# Patient Record
Sex: FEMALE | Race: OTHER | Employment: FULL TIME | URBAN - METROPOLITAN AREA
[De-identification: names, ages, dates, MRNs, and addresses within clinical notes are randomized per-mention and may not be internally consistent; named-entity substitution may affect disease eponyms.]

---

## 2017-11-13 ENCOUNTER — HOSPITAL ENCOUNTER (EMERGENCY)
Age: 27
Discharge: HOME | End: 2017-11-13
Attending: EMERGENCY MEDICINE

## 2017-11-13 VITALS
HEIGHT: 62 IN | SYSTOLIC BLOOD PRESSURE: 129 MMHG | TEMPERATURE: 98.3 F | WEIGHT: 120 LBS | BODY MASS INDEX: 22.08 KG/M2 | RESPIRATION RATE: 16 BRPM | DIASTOLIC BLOOD PRESSURE: 78 MMHG | OXYGEN SATURATION: 100 % | HEART RATE: 107 BPM

## 2017-11-13 DIAGNOSIS — B96.89 BACTERIAL VAGINITIS: ICD-10-CM

## 2017-11-13 DIAGNOSIS — N93.9 VAGINAL BLEEDING: Primary | ICD-10-CM

## 2017-11-13 DIAGNOSIS — Z20.2 STD EXPOSURE: ICD-10-CM

## 2017-11-13 DIAGNOSIS — N76.0 BACTERIAL VAGINITIS: ICD-10-CM

## 2017-11-13 LAB
-: ABNORMAL
AMORPHOUS: ABNORMAL
BACTERIA: ABNORMAL
BILIRUBIN URINE: NEGATIVE
CASTS UA: ABNORMAL /LPF
COLOR: YELLOW
COMMENT UA: ABNORMAL
CRYSTALS, UA: ABNORMAL /HPF
DIRECT EXAM: ABNORMAL
EPITHELIAL CELLS UA: ABNORMAL /HPF
GLUCOSE URINE: NEGATIVE
HCG(URINE) PREGNANCY TEST: NEGATIVE
KETONES, URINE: NEGATIVE
LEUKOCYTE ESTERASE, URINE: NEGATIVE
Lab: ABNORMAL
MUCUS: ABNORMAL
NITRITE, URINE: POSITIVE
OTHER OBSERVATIONS UA: ABNORMAL
PH UA: 7.5 (ref 5–8)
PROTEIN UA: NEGATIVE
RBC UA: ABNORMAL /HPF
RENAL EPITHELIAL, UA: ABNORMAL /HPF
SPECIFIC GRAVITY UA: 1.02 (ref 1–1.03)
SPECIMEN DESCRIPTION: ABNORMAL
SPECIMEN DESCRIPTION: ABNORMAL
STATUS: ABNORMAL
TRICHOMONAS: ABNORMAL
TURBIDITY: ABNORMAL
URINE HGB: ABNORMAL
UROBILINOGEN, URINE: NORMAL
WBC UA: ABNORMAL /HPF
YEAST: ABNORMAL

## 2017-11-13 PROCEDURE — 81001 URINALYSIS AUTO W/SCOPE: CPT

## 2017-11-13 PROCEDURE — 87510 GARDNER VAG DNA DIR PROBE: CPT

## 2017-11-13 PROCEDURE — 6370000000 HC RX 637 (ALT 250 FOR IP): Performed by: EMERGENCY MEDICINE

## 2017-11-13 PROCEDURE — 6360000002 HC RX W HCPCS: Performed by: EMERGENCY MEDICINE

## 2017-11-13 PROCEDURE — 87480 CANDIDA DNA DIR PROBE: CPT

## 2017-11-13 PROCEDURE — 87491 CHLMYD TRACH DNA AMP PROBE: CPT

## 2017-11-13 PROCEDURE — 87660 TRICHOMONAS VAGIN DIR PROBE: CPT

## 2017-11-13 PROCEDURE — 96372 THER/PROPH/DIAG INJ SC/IM: CPT

## 2017-11-13 PROCEDURE — 84703 CHORIONIC GONADOTROPIN ASSAY: CPT

## 2017-11-13 PROCEDURE — 99284 EMERGENCY DEPT VISIT MOD MDM: CPT

## 2017-11-13 PROCEDURE — 87591 N.GONORRHOEAE DNA AMP PROB: CPT

## 2017-11-13 RX ORDER — METRONIDAZOLE 500 MG/1
500 TABLET ORAL 2 TIMES DAILY
Qty: 14 TABLET | Refills: 0 | Status: SHIPPED | OUTPATIENT
Start: 2017-11-13 | End: 2017-11-20

## 2017-11-13 RX ORDER — AZITHROMYCIN 250 MG/1
1000 TABLET, FILM COATED ORAL ONCE
Status: COMPLETED | OUTPATIENT
Start: 2017-11-13 | End: 2017-11-13

## 2017-11-13 RX ORDER — IBUPROFEN 600 MG/1
600 TABLET ORAL ONCE
Status: COMPLETED | OUTPATIENT
Start: 2017-11-13 | End: 2017-11-13

## 2017-11-13 RX ORDER — CEFTRIAXONE 500 MG/1
250 INJECTION, POWDER, FOR SOLUTION INTRAMUSCULAR; INTRAVENOUS ONCE
Status: COMPLETED | OUTPATIENT
Start: 2017-11-13 | End: 2017-11-13

## 2017-11-13 RX ADMIN — IBUPROFEN 600 MG: 600 TABLET, FILM COATED ORAL at 11:37

## 2017-11-13 RX ADMIN — AZITHROMYCIN 1000 MG: 250 TABLET, FILM COATED ORAL at 11:36

## 2017-11-13 RX ADMIN — CEFTRIAXONE SODIUM 250 MG: 500 INJECTION, POWDER, FOR SOLUTION INTRAMUSCULAR; INTRAVENOUS at 11:37

## 2017-11-13 ASSESSMENT — PAIN SCALES - GENERAL: PAINLEVEL_OUTOF10: 0

## 2017-11-13 NOTE — ED PROVIDER NOTES
[address]  eMERGENCY dEPARTMENT eNCOUnter    Pt Name: Albino Douglas  MRN: [id number]  [place] 1990  Date of evaluation: 11/13/17  CHIEF COMPLAINT       Chief Complaint   Patient presents with    Threatened Miscarriage     HISTORY OF PRESENT ILLNESS   HPI  19-year-old female past medical history as listed below presents to ER with vaginal bleeding. Patient states her last menstrual period that she can remember was possibly 6 months ago. Patient denies pelvic pain or discharge. Patient's concerned about possible pregnancy. Patient states she was recently released And was informed that her partner tested positive for chlamydia. REVIEW OF SYSTEMS     Review of Systems   All other systems reviewed and are negative. PAST MEDICAL HISTORY     Past Medical History:   Diagnosis Date    Gall stones     Psychiatric problem     Seizure ([facility] [address] 9/17/2014    Substance abuse      SURGICAL HISTORY       Past Surgical History:   Procedure Laterality Date    CHOLECYSTECTOMY      GALLBLADDER SURGERY       CURRENT MEDICATIONS       Discharge Medication List as of 11/13/2017 12:16 PM      CONTINUE these medications which have NOT CHANGED    Details   QUEtiapine (SEROQUEL) 300 MG tablet Take 300 mg by mouth nightly. traZODone (DESYREL) 100 MG tablet Take 1 tablet by mouth nightly as needed for Sleep., Disp-15 tablet, R-0      QUEtiapine (SEROQUEL) 300 MG tablet Take 1 tablet by mouth nightly for 14 days. , Disp-14 tablet, R-0           ALLERGIES     is allergic to haldol [haloperidol lactate]. FAMILY HISTORY     has no family status information on file. SOCIAL HISTORY      reports that she has been smoking Cigarettes. She has a 1.50 pack-year smoking history. She has never used smokeless tobacco. She reports that she uses drugs, including Marijuana. She reports that she does not drink alcohol.   PHYSICAL EXAM     INITIAL VITALS: /78   Pulse 107   Temp 98.3 °F (36.8 °C) (Oral)   Resp 16 ultrasound images (except ED bedside ultrasound) are read by the radiologist and interpretations are viewed by the emergency physician. No orders to display     LABS: All lab results were reviewed by myself, and all abnormals are listed below. Labs Reviewed   VAGINITIS DNA PROBE - Abnormal; Notable for the following:        Result Value    Direct Exam POSITIVE for Gardnerella vaginalis. (*)     All other components within normal limits   URINALYSIS - Abnormal; Notable for the following:     Turbidity UA CLOUDY (*)     Urine Hgb MOD (*)     Nitrite, Urine POSITIVE (*)     All other components within normal limits   MICROSCOPIC URINALYSIS - Abnormal; Notable for the following:     Bacteria, UA MANY (*)     All other components within normal limits   C.TRACHOMATIS N.GONORRHOEAE DNA   PREGNANCY, URINE     EMERGENCY DEPARTMENT COURSE:     Vitals:    Vitals:    11/13/17 1026   BP: 129/78   Pulse: 107   Resp: 16   Temp: 98.3 °F (36.8 °C)   TempSrc: Oral   SpO2: 100%   Weight: 120 lb (54.4 kg)   Height: 5' 2\" (1.575 m)     The patient was given the following medications while in the emergency department:  Orders Placed This Encounter   Medications    azithromycin (ZITHROMAX) tablet 1,000 mg    cefTRIAXone (ROCEPHIN) injection 250 mg    ibuprofen (ADVIL;MOTRIN) tablet 600 mg    metroNIDAZOLE (FLAGYL) 500 MG tablet     Sig: Take 1 tablet by mouth 2 times daily for 7 days     Dispense:  14 tablet     Refill:  0     CONSULTS:  None    FINAL IMPRESSION      1. Vaginal bleeding    2. Bacterial vaginitis    3. STD exposure          DISPOSITION/PLAN   DISPOSITION Decision to Discharge    PATIENT REFERRED TO:  No follow-up provider specified.   DISCHARGE MEDICATIONS:  Discharge Medication List as of 11/13/2017 12:16 PM      START taking these medications    Details   metroNIDAZOLE (FLAGYL) 500 MG tablet Take 1 tablet by mouth 2 times daily for 7 days, Disp-14 tablet, R-0Print           Shea Singh MD  Attending

## 2017-11-13 NOTE — ED NOTES
Pt ambulated to the exit without assistance or difficulty; gait even and steady. Pt denied the need for a wheelchair. Pt educated on discharge instructions, pt verbalized understanding. Pt is eupneic, A&OX4, and PWD.        Dayana Marc RN  11/13/17 3766

## 2017-11-13 NOTE — ED NOTES
Pt at nurses station, requesting to be discharged; pt does not want to wait on results; Dr. Ricardo Valdez spoke with the pt and informed them what he was waiting on but pt insisted to be discharged.      Chhaya Peacock RN  11/13/17 3258

## 2017-11-14 LAB
C TRACH DNA GENITAL QL NAA+PROBE: NEGATIVE
N. GONORRHOEAE DNA: ABNORMAL

## 2018-01-01 ENCOUNTER — HOSPITAL ENCOUNTER (EMERGENCY)
Age: 28
Discharge: HOME | End: 2018-01-01
Attending: EMERGENCY MEDICINE

## 2018-01-01 VITALS
SYSTOLIC BLOOD PRESSURE: 133 MMHG | HEART RATE: 100 BPM | HEIGHT: 62 IN | OXYGEN SATURATION: 100 % | RESPIRATION RATE: 16 BRPM | DIASTOLIC BLOOD PRESSURE: 64 MMHG | BODY MASS INDEX: 22.08 KG/M2 | WEIGHT: 120 LBS | TEMPERATURE: 98.2 F

## 2018-01-01 DIAGNOSIS — F19.10 SUBSTANCE ABUSE (HCC): Primary | ICD-10-CM

## 2018-01-01 PROCEDURE — 99284 EMERGENCY DEPT VISIT MOD MDM: CPT

## 2018-01-01 ASSESSMENT — ENCOUNTER SYMPTOMS
COUGH: 0
BACK PAIN: 0
NAUSEA: 0
TROUBLE SWALLOWING: 0
SHORTNESS OF BREATH: 0
ABDOMINAL PAIN: 0
VOMITING: 0

## 2018-01-02 NOTE — ED PROVIDER NOTES
[address]  Emergency Department  Faculty Attestation     Pt Name: Remington Rojas  MRN: [id number]  [place] 1990  Date of evaluation: 1/2/18    I was personally available for consultation by the MLP in the Emergency Department for chart review, as well as discussion about the assessment, treatment plan and disposition. Remington Rojas is a 28 y.o. female who presents with Addiction Problem (pt presents with request to be admitted for opiate addiction; last use of heroin @ 14:30.)      Vitals:   Vitals:    01/01/18 1631   BP: 133/64   Pulse: 100   Resp: 16   Temp: 98.2 °F (36.8 °C)   TempSrc: Oral   SpO2: 100%   Weight: 120 lb (54.4 kg)   Height: 5' 2\" (1.575 m)       Impression:   1.  Substance abuse        Ines Membreno MD  Attending Emergency  Physician    (Please note that portions of this note were completed with a voice recognition program.  Efforts were made to edit the dictations but occasionally words are mis-transcribed.)        Yakov Nayak MD  01/02/18 9512

## 2018-05-15 ENCOUNTER — HOSPITAL ENCOUNTER (EMERGENCY)
Age: 28
Discharge: HOME | End: 2018-05-15
Attending: EMERGENCY MEDICINE
Payer: MEDICAID

## 2018-05-15 VITALS
TEMPERATURE: 97.1 F | SYSTOLIC BLOOD PRESSURE: 125 MMHG | RESPIRATION RATE: 15 BRPM | BODY MASS INDEX: 21.95 KG/M2 | OXYGEN SATURATION: 97 % | HEART RATE: 110 BPM | DIASTOLIC BLOOD PRESSURE: 75 MMHG | WEIGHT: 120 LBS

## 2018-05-15 DIAGNOSIS — L25.9 CONTACT DERMATITIS, UNSPECIFIED CONTACT DERMATITIS TYPE, UNSPECIFIED TRIGGER: Primary | ICD-10-CM

## 2018-05-15 PROCEDURE — 6370000000 HC RX 637 (ALT 250 FOR IP): Performed by: PHYSICIAN ASSISTANT

## 2018-05-15 PROCEDURE — 99282 EMERGENCY DEPT VISIT SF MDM: CPT

## 2018-05-15 RX ORDER — DIPHENHYDRAMINE HCL 25 MG
25 TABLET ORAL EVERY 6 HOURS PRN
Status: DISCONTINUED | OUTPATIENT
Start: 2018-05-15 | End: 2018-05-15 | Stop reason: HOSPADM

## 2018-05-15 RX ORDER — DIPHENHYDRAMINE HCL 25 MG
25 CAPSULE ORAL EVERY 6 HOURS PRN
Qty: 15 CAPSULE | Refills: 0 | Status: SHIPPED | OUTPATIENT
Start: 2018-05-15 | End: 2018-05-25

## 2018-05-15 RX ADMIN — DIPHENHYDRAMINE HCL 25 MG: 25 TABLET ORAL at 18:09

## 2018-05-15 ASSESSMENT — PAIN SCALES - GENERAL: PAINLEVEL_OUTOF10: 8

## 2018-05-15 ASSESSMENT — ENCOUNTER SYMPTOMS
COUGH: 0
WHEEZING: 0
SORE THROAT: 0
VOMITING: 0
NAUSEA: 0
BACK PAIN: 0
COLOR CHANGE: 0
RHINORRHEA: 0

## 2018-05-16 ENCOUNTER — HOSPITAL ENCOUNTER (OUTPATIENT)
Age: 28
Setting detail: SPECIMEN
Discharge: HOME | End: 2018-05-16
Payer: MEDICAID

## 2018-05-16 LAB
ABSOLUTE EOS #: 0.34 K/UL (ref 0–0.44)
ABSOLUTE IMMATURE GRANULOCYTE: <0.03 K/UL (ref 0–0.3)
ABSOLUTE LYMPH #: 2.51 K/UL (ref 1.1–3.7)
ABSOLUTE MONO #: 0.68 K/UL (ref 0.1–1.2)
ALBUMIN SERPL-MCNC: 3.8 G/DL (ref 3.5–5.2)
ALBUMIN/GLOBULIN RATIO: 1 (ref 1–2.5)
ALP BLD-CCNC: 73 U/L (ref 35–104)
ALT SERPL-CCNC: 81 U/L (ref 5–33)
ANION GAP SERPL CALCULATED.3IONS-SCNC: 13 MMOL/L (ref 9–17)
AST SERPL-CCNC: 47 U/L
BASOPHILS # BLD: 1 % (ref 0–2)
BASOPHILS ABSOLUTE: 0.03 K/UL (ref 0–0.2)
BILIRUB SERPL-MCNC: <0.1 MG/DL (ref 0.3–1.2)
BILIRUBIN DIRECT: <0.08 MG/DL
BILIRUBIN, INDIRECT: ABNORMAL MG/DL (ref 0–1)
BUN BLDV-MCNC: 8 MG/DL (ref 6–20)
CALCIUM SERPL-MCNC: 8.8 MG/DL (ref 8.6–10.4)
CHLORIDE BLD-SCNC: 95 MMOL/L (ref 98–107)
CHOLESTEROL/HDL RATIO: 3.6
CHOLESTEROL: 146 MG/DL
CO2: 25 MMOL/L (ref 20–31)
CREAT SERPL-MCNC: 0.62 MG/DL (ref 0.5–0.9)
DIFFERENTIAL TYPE: ABNORMAL
EOSINOPHILS RELATIVE PERCENT: 6 % (ref 1–4)
GFR AFRICAN AMERICAN: >60 ML/MIN
GFR NON-AFRICAN AMERICAN: >60 ML/MIN
GFR SERPL CREATININE-BSD FRML MDRD: ABNORMAL ML/MIN/{1.73_M2}
GFR SERPL CREATININE-BSD FRML MDRD: ABNORMAL ML/MIN/{1.73_M2}
GLUCOSE BLD-MCNC: 88 MG/DL (ref 70–99)
HAV IGM SER IA-ACNC: NONREACTIVE
HCG QUALITATIVE: NEGATIVE
HCT VFR BLD CALC: 37.9 % (ref 36.3–47.1)
HDLC SERPL-MCNC: 41 MG/DL
HEMOGLOBIN: 11.4 G/DL (ref 11.9–15.1)
HEPATITIS B CORE IGM ANTIBODY: NONREACTIVE
HEPATITIS B SURFACE ANTIGEN: NONREACTIVE
HEPATITIS C ANTIBODY: REACTIVE
HIV AG/AB: NONREACTIVE
IMMATURE GRANULOCYTES: 0 %
LDL CHOLESTEROL: 77 MG/DL (ref 0–130)
LYMPHOCYTES # BLD: 44 % (ref 24–43)
MCH RBC QN AUTO: 24.6 PG (ref 25.2–33.5)
MCHC RBC AUTO-ENTMCNC: 30.1 G/DL (ref 28.4–34.8)
MCV RBC AUTO: 81.7 FL (ref 82.6–102.9)
MONOCYTES # BLD: 12 % (ref 3–12)
NRBC AUTOMATED: 0 PER 100 WBC
PDW BLD-RTO: 17.8 % (ref 11.8–14.4)
PLATELET # BLD: 216 K/UL (ref 138–453)
PLATELET ESTIMATE: ABNORMAL
PMV BLD AUTO: 10.3 FL (ref 8.1–13.5)
POTASSIUM SERPL-SCNC: 4.5 MMOL/L (ref 3.7–5.3)
RBC # BLD: 4.64 M/UL (ref 3.95–5.11)
RBC # BLD: ABNORMAL 10*6/UL
SEG NEUTROPHILS: 37 % (ref 36–65)
SEGMENTED NEUTROPHILS ABSOLUTE COUNT: 2.09 K/UL (ref 1.5–8.1)
SODIUM BLD-SCNC: 133 MMOL/L (ref 135–144)
T3 FREE: 3.77 PG/ML (ref 2.02–4.43)
THYROXINE, FREE: 1.16 NG/DL (ref 0.93–1.7)
TOTAL PROTEIN: 7.5 G/DL (ref 6.4–8.3)
TRIGL SERPL-MCNC: 139 MG/DL
TSH SERPL DL<=0.05 MIU/L-ACNC: 1.03 MIU/L (ref 0.3–5)
VLDLC SERPL CALC-MCNC: NORMAL MG/DL (ref 1–30)
WBC # BLD: 5.7 K/UL (ref 3.5–11.3)
WBC # BLD: ABNORMAL 10*3/UL

## 2018-05-18 LAB
QUANTIFERON (R) TB GOLD (INCUBATED): NEGATIVE
QUANTIFERON MITOGEN: >10 IU/ML
QUANTIFERON NIL: 0.04 IU/ML
QUANTIFERON TB AG MINUS NIL: 0.04 IU/ML (ref 0–0.34)

## 2018-09-13 ENCOUNTER — HOSPITAL ENCOUNTER (INPATIENT)
Age: 28
LOS: 1 days | Discharge: LEFT BEFORE DISCHARGE | DRG: 770 | End: 2018-09-13
Attending: INTERNAL MEDICINE | Admitting: INTERNAL MEDICINE
Payer: MEDICAID

## 2018-09-13 VITALS
BODY MASS INDEX: 21.05 KG/M2 | TEMPERATURE: 98.2 F | WEIGHT: 115.08 LBS | DIASTOLIC BLOOD PRESSURE: 89 MMHG | SYSTOLIC BLOOD PRESSURE: 123 MMHG | RESPIRATION RATE: 23 BRPM | OXYGEN SATURATION: 100 % | HEART RATE: 146 BPM

## 2018-09-13 PROBLEM — F14.10 COCAINE ABUSE (HCC): Status: ACTIVE | Noted: 2018-09-13

## 2018-09-13 PROBLEM — F17.200 SMOKING: Status: RESOLVED | Noted: 2018-09-13 | Resolved: 2018-09-13

## 2018-09-13 PROBLEM — R76.8 HEPATITIS C ANTIBODY TEST POSITIVE: Status: ACTIVE | Noted: 2018-09-13

## 2018-09-13 PROBLEM — F11.90 HEROIN USE: Status: RESOLVED | Noted: 2018-09-13 | Resolved: 2018-09-13

## 2018-09-13 PROBLEM — F10.10 ALCOHOL ABUSE: Status: ACTIVE | Noted: 2018-09-13

## 2018-09-13 PROBLEM — R56.9 SEIZURES (HCC): Status: ACTIVE | Noted: 2018-09-13

## 2018-09-13 PROBLEM — F10.10 ALCOHOL ABUSE: Status: RESOLVED | Noted: 2018-09-13 | Resolved: 2018-09-13

## 2018-09-13 PROBLEM — F17.200 SMOKING: Status: ACTIVE | Noted: 2018-09-13

## 2018-09-13 PROBLEM — F19.10 POLYSUBSTANCE ABUSE (HCC): Status: ACTIVE | Noted: 2018-09-13

## 2018-09-13 PROBLEM — F11.90 HEROIN USE: Status: ACTIVE | Noted: 2018-09-13

## 2018-09-13 PROBLEM — Z72.0 TOBACCO USE: Status: ACTIVE | Noted: 2018-09-13

## 2018-09-13 PROBLEM — F14.10 COCAINE ABUSE (HCC): Status: RESOLVED | Noted: 2018-09-13 | Resolved: 2018-09-13

## 2018-09-13 PROBLEM — F19.90 IV DRUG USER: Status: ACTIVE | Noted: 2018-09-13

## 2018-09-13 PROBLEM — Z78.9 HISTORY OF INCARCERATION: Status: ACTIVE | Noted: 2018-09-13

## 2018-09-13 LAB
ABSOLUTE EOS #: 0.07 K/UL (ref 0–0.44)
ABSOLUTE IMMATURE GRANULOCYTE: <0.03 K/UL (ref 0–0.3)
ABSOLUTE LYMPH #: 2.43 K/UL (ref 1.1–3.7)
ABSOLUTE MONO #: 0.65 K/UL (ref 0.1–1.2)
ACETAMINOPHEN LEVEL: <5 UG/ML (ref 10–30)
ALBUMIN SERPL-MCNC: 3.4 G/DL (ref 3.5–5.2)
ALBUMIN/GLOBULIN RATIO: 0.9 (ref 1–2.5)
ALLEN TEST: POSITIVE
ALP BLD-CCNC: 169 U/L (ref 35–104)
ALT SERPL-CCNC: 68 U/L (ref 5–33)
ANION GAP SERPL CALCULATED.3IONS-SCNC: 12 MMOL/L (ref 9–17)
ANION GAP: 10 MMOL/L (ref 7–16)
AST SERPL-CCNC: 27 U/L
BASOPHILS # BLD: 0 % (ref 0–2)
BASOPHILS ABSOLUTE: <0.03 K/UL (ref 0–0.2)
BILIRUB SERPL-MCNC: 0.26 MG/DL (ref 0.3–1.2)
BUN BLDV-MCNC: 4 MG/DL (ref 6–20)
BUN/CREAT BLD: ABNORMAL (ref 9–20)
CALCIUM SERPL-MCNC: 8.8 MG/DL (ref 8.6–10.4)
CHLORIDE BLD-SCNC: 102 MMOL/L (ref 98–107)
CO2: 23 MMOL/L (ref 20–31)
CREAT SERPL-MCNC: 0.7 MG/DL (ref 0.5–0.9)
DIFFERENTIAL TYPE: ABNORMAL
EKG ATRIAL RATE: 84 BPM
EKG P AXIS: 55 DEGREES
EKG P-R INTERVAL: 122 MS
EKG Q-T INTERVAL: 348 MS
EKG QRS DURATION: 88 MS
EKG QTC CALCULATION (BAZETT): 411 MS
EKG R AXIS: 96 DEGREES
EKG T AXIS: 47 DEGREES
EKG VENTRICULAR RATE: 84 BPM
EOSINOPHILS RELATIVE PERCENT: 1 % (ref 1–4)
ETHANOL PERCENT: <0.01 %
ETHANOL: <10 MG/DL
FIO2: 21
GFR AFRICAN AMERICAN: >60 ML/MIN
GFR NON-AFRICAN AMERICAN: >60 ML/MIN
GFR NON-AFRICAN AMERICAN: >60 ML/MIN
GFR SERPL CREATININE-BSD FRML MDRD: >60 ML/MIN
GFR SERPL CREATININE-BSD FRML MDRD: ABNORMAL ML/MIN/{1.73_M2}
GFR SERPL CREATININE-BSD FRML MDRD: ABNORMAL ML/MIN/{1.73_M2}
GFR SERPL CREATININE-BSD FRML MDRD: NORMAL ML/MIN/{1.73_M2}
GLUCOSE BLD-MCNC: 81 MG/DL (ref 70–99)
GLUCOSE BLD-MCNC: 82 MG/DL (ref 65–105)
GLUCOSE BLD-MCNC: 90 MG/DL (ref 74–100)
HCT VFR BLD CALC: 36 % (ref 36.3–47.1)
HEMOGLOBIN: 10.9 G/DL (ref 11.9–15.1)
IMMATURE GRANULOCYTES: 0 %
INR BLD: 1
LACTIC ACID, WHOLE BLOOD: 1.1 MMOL/L (ref 0.7–2.1)
LYMPHOCYTES # BLD: 38 % (ref 24–43)
MCH RBC QN AUTO: 24.2 PG (ref 25.2–33.5)
MCHC RBC AUTO-ENTMCNC: 30.3 G/DL (ref 28.4–34.8)
MCV RBC AUTO: 80 FL (ref 82.6–102.9)
MODE: ABNORMAL
MONOCYTES # BLD: 10 % (ref 3–12)
MRSA, DNA, NASAL: NORMAL
MYOGLOBIN: <21 NG/ML (ref 25–58)
NEGATIVE BASE EXCESS, ART: ABNORMAL (ref 0–2)
NRBC AUTOMATED: 0 PER 100 WBC
O2 DEVICE/FLOW/%: ABNORMAL
PATIENT TEMP: ABNORMAL
PDW BLD-RTO: 16.7 % (ref 11.8–14.4)
PHENYTOIN DATE LAST DOSE: NORMAL
PHENYTOIN DOSE AMOUNT: NORMAL
PHENYTOIN DOSE TIME: NORMAL
PHENYTOIN FREE: 1.4 UG/ML (ref 1–2)
PHENYTOIN LEVEL: 14 UG/ML (ref 10–20)
PLATELET # BLD: 251 K/UL (ref 138–453)
PLATELET ESTIMATE: ABNORMAL
PMV BLD AUTO: 9.7 FL (ref 8.1–13.5)
POC CHLORIDE: 106 MMOL/L (ref 98–107)
POC CREATININE: 0.75 MG/DL (ref 0.51–1.19)
POC HCO3: 26.3 MMOL/L (ref 21–28)
POC HEMATOCRIT: 36 % (ref 36–46)
POC HEMOGLOBIN: 12.2 G/DL (ref 12–16)
POC IONIZED CALCIUM: 1.26 MMOL/L (ref 1.15–1.33)
POC LACTIC ACID: 0.33 MMOL/L (ref 0.56–1.39)
POC O2 SATURATION: 96 % (ref 94–98)
POC PCO2 TEMP: ABNORMAL MM HG
POC PCO2: 36.9 MM HG (ref 35–48)
POC PH TEMP: ABNORMAL
POC PH: 7.46 (ref 7.35–7.45)
POC PO2 TEMP: ABNORMAL MM HG
POC PO2: 78.5 MM HG (ref 83–108)
POC POTASSIUM: 3.7 MMOL/L (ref 3.5–4.5)
POC SODIUM: 142 MMOL/L (ref 138–146)
POSITIVE BASE EXCESS, ART: 2 (ref 0–3)
POTASSIUM SERPL-SCNC: 3.6 MMOL/L (ref 3.7–5.3)
PROTHROMBIN TIME: 11.1 SEC (ref 9–12)
RBC # BLD: 4.5 M/UL (ref 3.95–5.11)
RBC # BLD: ABNORMAL 10*6/UL
SALICYLATE LEVEL: <1 MG/DL (ref 3–10)
SAMPLE SITE: ABNORMAL
SEG NEUTROPHILS: 51 % (ref 36–65)
SEGMENTED NEUTROPHILS ABSOLUTE COUNT: 3.25 K/UL (ref 1.5–8.1)
SODIUM BLD-SCNC: 137 MMOL/L (ref 135–144)
SPECIMEN DESCRIPTION: NORMAL
TCO2 (CALC), ART: 27 MMOL/L (ref 22–29)
TOTAL CK: 32 U/L (ref 26–192)
TOTAL PROTEIN: 7.4 G/DL (ref 6.4–8.3)
TOXIC TRICYCLIC SC,BLOOD: NEGATIVE
TSH SERPL DL<=0.05 MIU/L-ACNC: 0.76 MIU/L (ref 0.3–5)
WBC # BLD: 6.4 K/UL (ref 3.5–11.3)
WBC # BLD: ABNORMAL 10*3/UL

## 2018-09-13 PROCEDURE — 82947 ASSAY GLUCOSE BLOOD QUANT: CPT

## 2018-09-13 PROCEDURE — 2000000000 HC ICU R&B

## 2018-09-13 PROCEDURE — 80186 ASSAY OF PHENYTOIN FREE: CPT

## 2018-09-13 PROCEDURE — 85025 COMPLETE CBC W/AUTO DIFF WBC: CPT

## 2018-09-13 PROCEDURE — 95951 HC EEG MONITORING VIDEO RECORDING: CPT

## 2018-09-13 PROCEDURE — 87040 BLOOD CULTURE FOR BACTERIA: CPT

## 2018-09-13 PROCEDURE — 87641 MR-STAPH DNA AMP PROBE: CPT

## 2018-09-13 PROCEDURE — 82803 BLOOD GASES ANY COMBINATION: CPT

## 2018-09-13 PROCEDURE — 94762 N-INVAS EAR/PLS OXIMTRY CONT: CPT

## 2018-09-13 PROCEDURE — 82435 ASSAY OF BLOOD CHLORIDE: CPT

## 2018-09-13 PROCEDURE — 36600 WITHDRAWAL OF ARTERIAL BLOOD: CPT

## 2018-09-13 PROCEDURE — 6360000002 HC RX W HCPCS: Performed by: STUDENT IN AN ORGANIZED HEALTH CARE EDUCATION/TRAINING PROGRAM

## 2018-09-13 PROCEDURE — 83605 ASSAY OF LACTIC ACID: CPT

## 2018-09-13 PROCEDURE — 84443 ASSAY THYROID STIM HORMONE: CPT

## 2018-09-13 PROCEDURE — 82565 ASSAY OF CREATININE: CPT

## 2018-09-13 PROCEDURE — G0480 DRUG TEST DEF 1-7 CLASSES: HCPCS

## 2018-09-13 PROCEDURE — 36415 COLL VENOUS BLD VENIPUNCTURE: CPT

## 2018-09-13 PROCEDURE — 85014 HEMATOCRIT: CPT

## 2018-09-13 PROCEDURE — 96375 TX/PRO/DX INJ NEW DRUG ADDON: CPT

## 2018-09-13 PROCEDURE — 81001 URINALYSIS AUTO W/SCOPE: CPT

## 2018-09-13 PROCEDURE — 93005 ELECTROCARDIOGRAM TRACING: CPT

## 2018-09-13 PROCEDURE — 80307 DRUG TEST PRSMV CHEM ANLYZR: CPT

## 2018-09-13 PROCEDURE — 96372 THER/PROPH/DIAG INJ SC/IM: CPT

## 2018-09-13 PROCEDURE — 99291 CRITICAL CARE FIRST HOUR: CPT | Performed by: INTERNAL MEDICINE

## 2018-09-13 PROCEDURE — 84132 ASSAY OF SERUM POTASSIUM: CPT

## 2018-09-13 PROCEDURE — 83874 ASSAY OF MYOGLOBIN: CPT

## 2018-09-13 PROCEDURE — 96374 THER/PROPH/DIAG INJ IV PUSH: CPT

## 2018-09-13 PROCEDURE — 82330 ASSAY OF CALCIUM: CPT

## 2018-09-13 PROCEDURE — G0378 HOSPITAL OBSERVATION PER HR: HCPCS

## 2018-09-13 PROCEDURE — 85610 PROTHROMBIN TIME: CPT

## 2018-09-13 PROCEDURE — 99254 IP/OBS CNSLTJ NEW/EST MOD 60: CPT | Performed by: PSYCHIATRY & NEUROLOGY

## 2018-09-13 PROCEDURE — 2580000003 HC RX 258: Performed by: STUDENT IN AN ORGANIZED HEALTH CARE EDUCATION/TRAINING PROGRAM

## 2018-09-13 PROCEDURE — 2500000003 HC RX 250 WO HCPCS: Performed by: STUDENT IN AN ORGANIZED HEALTH CARE EDUCATION/TRAINING PROGRAM

## 2018-09-13 PROCEDURE — 80053 COMPREHEN METABOLIC PANEL: CPT

## 2018-09-13 PROCEDURE — 82550 ASSAY OF CK (CPK): CPT

## 2018-09-13 PROCEDURE — 87086 URINE CULTURE/COLONY COUNT: CPT

## 2018-09-13 PROCEDURE — 80185 ASSAY OF PHENYTOIN TOTAL: CPT

## 2018-09-13 PROCEDURE — 84295 ASSAY OF SERUM SODIUM: CPT

## 2018-09-13 PROCEDURE — G0379 DIRECT REFER HOSPITAL OBSERV: HCPCS

## 2018-09-13 RX ORDER — LORAZEPAM 2 MG/ML
1 INJECTION INTRAMUSCULAR
Status: DISCONTINUED | OUTPATIENT
Start: 2018-09-13 | End: 2018-09-13

## 2018-09-13 RX ORDER — LEVETIRACETAM 500 MG/1
500 TABLET ORAL 2 TIMES DAILY
Status: DISCONTINUED | OUTPATIENT
Start: 2018-09-13 | End: 2018-09-13

## 2018-09-13 RX ORDER — LORAZEPAM 2 MG/ML
2 INJECTION INTRAMUSCULAR EVERY 5 MIN PRN
Status: DISCONTINUED | OUTPATIENT
Start: 2018-09-13 | End: 2018-09-13

## 2018-09-13 RX ORDER — SODIUM CHLORIDE 0.9 % (FLUSH) 0.9 %
10 SYRINGE (ML) INJECTION EVERY 12 HOURS SCHEDULED
Status: DISCONTINUED | OUTPATIENT
Start: 2018-09-13 | End: 2018-09-13 | Stop reason: HOSPADM

## 2018-09-13 RX ORDER — SODIUM CHLORIDE 9 MG/ML
INJECTION, SOLUTION INTRAVENOUS CONTINUOUS
Status: DISCONTINUED | OUTPATIENT
Start: 2018-09-13 | End: 2018-09-13 | Stop reason: HOSPADM

## 2018-09-13 RX ORDER — SODIUM CHLORIDE 0.9 % (FLUSH) 0.9 %
10 SYRINGE (ML) INJECTION PRN
Status: DISCONTINUED | OUTPATIENT
Start: 2018-09-13 | End: 2018-09-13 | Stop reason: HOSPADM

## 2018-09-13 RX ORDER — LORAZEPAM 2 MG/ML
2 INJECTION INTRAMUSCULAR EVERY 5 MIN PRN
Status: DISCONTINUED | OUTPATIENT
Start: 2018-09-13 | End: 2018-09-13 | Stop reason: HOSPADM

## 2018-09-13 RX ORDER — LORAZEPAM 2 MG/ML
4 INJECTION INTRAMUSCULAR EVERY 5 MIN PRN
Status: DISCONTINUED | OUTPATIENT
Start: 2018-09-13 | End: 2018-09-13

## 2018-09-13 RX ORDER — 0.9 % SODIUM CHLORIDE 0.9 %
1000 INTRAVENOUS SOLUTION INTRAVENOUS ONCE
Status: COMPLETED | OUTPATIENT
Start: 2018-09-13 | End: 2018-09-13

## 2018-09-13 RX ORDER — LEVETIRACETAM 5 MG/ML
500 INJECTION INTRAVASCULAR EVERY 12 HOURS
Status: DISCONTINUED | OUTPATIENT
Start: 2018-09-13 | End: 2018-09-13 | Stop reason: HOSPADM

## 2018-09-13 RX ORDER — LORAZEPAM 2 MG/ML
1 INJECTION INTRAMUSCULAR EVERY 5 MIN PRN
Status: DISCONTINUED | OUTPATIENT
Start: 2018-09-13 | End: 2018-09-13

## 2018-09-13 RX ORDER — FENTANYL CITRATE 50 UG/ML
25 INJECTION, SOLUTION INTRAMUSCULAR; INTRAVENOUS
Status: DISCONTINUED | OUTPATIENT
Start: 2018-09-13 | End: 2018-09-13 | Stop reason: HOSPADM

## 2018-09-13 RX ORDER — AMMONIA INHALANTS 0.04 G/.3ML
0.3 INHALANT RESPIRATORY (INHALATION) ONCE
Status: DISCONTINUED | OUTPATIENT
Start: 2018-09-13 | End: 2018-09-13 | Stop reason: HOSPADM

## 2018-09-13 RX ORDER — CLONIDINE HYDROCHLORIDE 0.1 MG/1
0.1 TABLET ORAL 2 TIMES DAILY
Status: DISCONTINUED | OUTPATIENT
Start: 2018-09-13 | End: 2018-09-13

## 2018-09-13 RX ORDER — ONDANSETRON 2 MG/ML
4 INJECTION INTRAMUSCULAR; INTRAVENOUS EVERY 6 HOURS PRN
Status: DISCONTINUED | OUTPATIENT
Start: 2018-09-13 | End: 2018-09-13 | Stop reason: HOSPADM

## 2018-09-13 RX ORDER — OXYCODONE HYDROCHLORIDE 5 MG/1
10 TABLET ORAL EVERY 6 HOURS
Status: DISCONTINUED | OUTPATIENT
Start: 2018-09-13 | End: 2018-09-13

## 2018-09-13 RX ADMIN — SODIUM CHLORIDE: 9 INJECTION, SOLUTION INTRAVENOUS at 06:24

## 2018-09-13 RX ADMIN — LEVETIRACETAM 500 MG: 5 INJECTION INTRAVENOUS at 08:43

## 2018-09-13 RX ADMIN — FENTANYL CITRATE 25 MCG: 50 INJECTION INTRAMUSCULAR; INTRAVENOUS at 14:01

## 2018-09-13 RX ADMIN — SODIUM CHLORIDE 1000 ML: 9 INJECTION, SOLUTION INTRAVENOUS at 05:24

## 2018-09-13 RX ADMIN — FOLIC ACID: 5 INJECTION, SOLUTION INTRAMUSCULAR; INTRAVENOUS; SUBCUTANEOUS at 09:03

## 2018-09-13 RX ADMIN — LORAZEPAM 1 MG: 2 INJECTION INTRAMUSCULAR; INTRAVENOUS at 13:11

## 2018-09-13 RX ADMIN — ENOXAPARIN SODIUM 40 MG: 40 INJECTION SUBCUTANEOUS at 08:49

## 2018-09-13 ASSESSMENT — PAIN SCALES - GENERAL
PAINLEVEL_OUTOF10: 0
PAINLEVEL_OUTOF10: 3

## 2018-09-13 NOTE — FLOWSHEET NOTE
[facility] [address] in [facility] and [place] [address], patient has not been to either pharmacy in over 3 years

## 2018-09-13 NOTE — PROCEDURES
LONG-TERM EEG-VIDEO [address] [facility]    Patient: Elba Crowell  Age: 28 y.o. MRN: [id number]  Dates of recordin2018 through 2018    Referring Physician: No ref. provider found  History: The patient is a 28 y.o. female who presented with encephalopathy. This long-term video-EEG monitoring study was performed in order to determine the nature of the patient's clinical events. The patient is on neuroactive medications. Elba Crowell   Current Facility-Administered Medications   Medication Dose Route Frequency Provider Last Rate Last Dose    ammonia inhaler 0.3 mL  0.3 mL Inhalation Once Pancho MD Lesley        sodium chloride flush 0.9 % injection 10 mL  10 mL Intravenous 2 times per day Handy Flores,         sodium chloride flush 0.9 % injection 10 mL  10 mL Intravenous PRN Camille Akash, DO        magnesium hydroxide (MILK OF MAGNESIA) 400 MG/5ML suspension 30 mL  30 mL Oral Daily PRN Camille Akash, DO        ondansetron (ZOFRAN) injection 4 mg  4 mg Intravenous Q6H PRN Camille Akash, DO        enoxaparin (LOVENOX) injection 40 mg  40 mg Subcutaneous Daily Camille Akash, DO   40 mg at 18 0849    0.9 % sodium chloride infusion   Intravenous Continuous Camille Akash, DO   Stopped at 18 0903    levetiracetam (KEPPRA) 500 mg/100 mL IVPB  500 mg Intravenous Q12H Camille Akash, DO   500 mg at 18 0843    LORazepam (ATIVAN) injection 1 mg  1 mg Intravenous Q2H PRN Handy Flores, DO         Technical Description: This is a 21 channel digital EEG recording with time-locked video. Electrodes were placed in accordance with the 10-20 International System of Electrode Placement. Single lead EKG monitoring was included. Baseline EEG Recording:  A formal baseline EEG recording was not obtained.      Day 1 - 2018, starting at 6:27 reviewed through end of the recording at 12:48    Interictal EEG

## 2018-09-13 NOTE — FLOWSHEET NOTE
Patient deciding to leave AMA, CC at bedside and talked with patient, CC explained risks for leaving AMA, patient signed paper, IV taken out

## 2018-09-13 NOTE — CONSULTS
I had the pleasure of seeing your patient in neurology consultation for her symptoms. As you would recall Josh Hughes is a 28 y.o. yo female admitted on 9/13/2018. The history has been obtained from the patient and from medical records. The patient is very limited historian. Apparently the patient presented with multiple seizure-like spells. She was in penitentiary when she had a witnessed seizure. While she came to the hospital, there was a concern of polysubstance abuse. The patient was started on long-term EEG monitoring and was put on Keppra. The initial long-term EEG monitoring reports were negative. The patient pulled off the EEG leads. She had one witnessed generalized tonic stiffening and extremities but during the event, she was interactive with the nurse. Past Medical History:   Diagnosis Date    Gall stones     Psychiatric problem     Seizure ([address] 9/17/2014    Substance abuse      Past Surgical History:   Procedure Laterality Date    CHOLECYSTECTOMY      GALLBLADDER SURGERY       Social History     Social History    Marital status: Single     Spouse name: N/A    Number of children: 2    Years of education: N/A     Occupational History     [employer]     Social History Main Topics    Smoking status: Current Every Day Smoker     Packs/day: 0.50     Years: 3.00     Types: Cigarettes    Smokeless tobacco: Never Used    Alcohol use No      Comment: last drank alcohol 2 months ago    Drug use: Yes     Types: Marijuana, IV, Cocaine      Comment: [place] white, xanax, IV heroin    Sexual activity: Not Currently     Partners: Male     Other Topics Concern    Not on file     Social History Narrative    No narrative on file     The patient has a family history of insignificant  ROS:   Neurological examination:    Mental status   The patient is awake. She is in moderate to severe distress. She is tearful.      Cranial nerves   II - visual fields intact to confrontation III, IV, VI - extra-ocular muscles full: no pupillary defect; no ADELITA, no nystagmus, no ptosis                                                                      V - normal facial sensation                                                               VII - normal facial symmetry                                                             VIII - intact hearing                                                                             IX, X - symmetrical palate                                                                  XI - symmetrical shoulder shrug                                                       XII - midline tongue without atrophy or fasciculation     Motor function  Normal muscle bulk and tone; normal power 5/5, including fine motor movements     Sensory function Grossly intact to all 3 modalities    Cerebellar No visible tremors    Reflex function Intact 2+ DTR and symmetric. Negative Babinski     Gait                  Not tested         Lab Results   Component Value Date    LDLCHOLESTEROL 77 05/16/2018     No components found for: CHLPL  Lab Results   Component Value Date    TRIG 139 05/16/2018     Lab Results   Component Value Date    HDL 41 05/16/2018     No results found for: LDLCALC  No results found for: LABVLDL  No results found for: LABA1C  No results found for: EAG  No results found for: [id number]   Neurological work up:  CT head  CTA head and neck  MRI brain   2 D echo     Assessment and Recommendations: Witnessed generalized seizure like spells, likely psychogenic nonepileptic seizures (PNES)    Patient's long-term video EEG monitoring is normal.  She had a witnessed episode of seizure but she took her EEG leads off. The patient states that the EEG leads cause her to have facial twitching and she would not get the EEG hookup. She also refused to undergo MRI scan of the brain.     At this time, her neurologic exam is nonfocal.  I would discontinue

## 2018-09-13 NOTE — FLOWSHEET NOTE
EEG leads pulled off by patient, about 15 minutes later patient had seizure like activity, first seizure for 3:38 minutes, second for a little over 8 minutes, 1 mg ativan given, RN called patients name and patient responded, Dr Deborah Almanza and Dr Pilar Petit arrived at bedside.

## 2018-09-13 NOTE — H&P
Critical Care - History and Physical Examination    Patient's name:  Agnes 18 Record Number: [id number]  Patient's account/billing number: [de-identified]  Patient's YOB: 1990  Age: 28 y.o. Date of Admission: 9/13/2018  3:24 AM  Date of History and Physical Examination: 9/13/2018      Primary Care Physician: No primary care provider on file. Attending Physician: Dr. Frantz Gonzalez Status: Full Code    Chief complaint: Multiple seizures concern for intubation, with history of Polysubstance abuse    HISTORY OF PRESENT ILLNESS:   History was obtained from chart review and the patientBenja Clay is a 28 y.o. with past medical history of \"generalized tonic seizures\" who had a seizure in penitentiary without any bowel or urinary incontinence and was taken to [facility] for evaluation. Patient stated that she was picked up for \"mistaken identity but had 180 days to her name\" and so she was taken to penitentiary. Patient states that she has had a cough for the last few days before she was picked up for penitentiary and she denies any fever chills or recent sick contacts. She lives at home with her grandmother. She was admitted to [facility] at that time and started on scheduled Librium for possible withdrawal. CIWA protocol was started and scale started at 14 on 9/11 and down to 0-4 on 9/12. On 9/12/18, patient had 3 episodes of seizures, and was given 2mg Ativan x 4 doses and Keppra loading dose of 1g. Longest seizure reported as 20 minutes with \"unresponsive, full body muscle convulsions, teeth clenching\". They reported a Tmax of 100.8 but I do not see any temperature in her records. Patient does not recall these multiple seizures. Patient normotensive and normocardic at [facility].  Urine drug screen showed positive for THC, cocaine, methamphetamines, opiates, amphetamines with alcohol level less than 10. CPK of 39 and no leukocytosis was seen was a white count of 5.5, hemoglobin stable at 9.4. Authorizing Provider   QUEtiapine (SEROQUEL) 300 MG tablet Take 300 mg by mouth nightly. 10/7/14 10/15/14  Belinda Champagne MD   traZODone (DESYREL) 100 MG tablet Take 1 tablet by mouth nightly as needed for Sleep. 9/22/14   Belinda Champagne MD   QUEtiapine (SEROQUEL) 300 MG tablet Take 1 tablet by mouth nightly for 14 days. 9/22/14 10/6/14  Belinda Champagne MD       Social History:   TOBACCO:   reports that she has been smoking Cigarettes. She has a 1.50 pack-year smoking history. She has never used smokeless tobacco.  ETOH:   reports that she does not drink alcohol. last used 2 months ago. DRUGS:  reports that she uses drugs, including Marijuana, IV, and Cocaine. Family History:   Family History   Problem Relation Age of Onset    Diabetes Mother         Grandparents    Cancer Other         Breast, great aunt       REVIEW OF SYSTEMS:  Constitutional: negative fever, negative chills  HEENT: negative visual disturbances, negative headaches  Respiratory: negative dyspnea, negative cough  Cardiovascular: negative chest pain,  negative palpitations  Gastrointestinal: negative abdominal pain, negative RUQ pain, negative N/V, negative diarrhea, negative constipation  Genitourinary: negative dysuria, negative vaginal discharge  Dermatological: negative rash  Hematologic: negative bruising  Immunologic/Lymphatic: negative recent illness, negative recent sick contact  Musculoskeletal: negative back pain  Neurological:  negative dizziness, negative weakness  Behavior/Psych: negative depression, negative anxiety        Physical Exam:    Vitals: Temp 97.9 °F (36.6 °C) (Axillary)   Wt 115 lb 1.3 oz (52.2 kg)   BMI 21.05 kg/m²     Last Body weight:   Wt Readings from Last 3 Encounters:   09/13/18 115 lb 1.3 oz (52.2 kg)   05/15/18 120 lb (54.4 kg)   01/01/18 120 lb (54.4 kg)       Body Mass Index : Body mass index is 21.05 kg/m².         PHYSICAL EXAMINATION :  General appearance - drowsy, difficult to arouse at times, cooperative, continuing to ask for food  Mental status - agitated, drowsy  Eyes - pupils 4mm equal and reactive, extraocular eye movements intact  Nose - normal and patent, no erythema, discharge or polyps  Mouth - mucous membranes moist, pharynx normal without lesions  Neck - supple, no significant adenopathy  Chest - clear to auscultation, no wheezes, rales or rhonchi, symmetric air entry  Heart - normal rate, regular rhythm, normal S1, S2, no murmurs, rubs, clicks or gallops  Abdomen - soft, nontender, nondistended, no masses or organomegaly, normoactive bowel sounds throughout  Neurological - alert, oriented, normal speech, no focal findings or movement disorder noted, negative babinski sign bilaterally  Extremities - peripheral pulses normal, no pedal edema, no clubbing or cyanosis  Skin - normal coloration and turgor, no rashes, no suspicious skin lesions noted       Laboratory findings:-  Thyroid functions:   Lab Results   Component Value Date    TSH 1.03 05/16/2018      Results for orders placed or performed during the hospital encounter of 09/13/18   Hemoglobin and hematocrit, blood   Result Value Ref Range    POC Hemoglobin 12.2 12.0 - 16.0 g/dL    POC Hematocrit 36 36 - 46 %   SODIUM (POC)   Result Value Ref Range    POC Sodium 142 138 - 146 mmol/L   POTASSIUM (POC)   Result Value Ref Range    POC Potassium 3.7 3.5 - 4.5 mmol/L   CHLORIDE (POC)   Result Value Ref Range    POC Chloride 106 98 - 107 mmol/L   CALCIUM, IONIC (POC)   Result Value Ref Range    POC Ionized Calcium 1.26 1.15 - 1.33 mmol/L   Arterial Blood Gas, POC   Result Value Ref Range    POC pH 7.460 (H) 7.350 - 7.450    POC pCO2 36.9 35.0 - 48.0 mm Hg    POC PO2 78.5 (L) 83.0 - 108.0 mm Hg    POC HCO3 26.3 21.0 - 28.0 mmol/L    TCO2 (calc), Art 27 22.0 - 29.0 mmol/L    Negative Base Excess, Art NOT REPORTED 0.0 - 2.0    Positive Base Excess, Art 2 0.0 - 3.0    POC O2 SAT 96 94.0 - 98.0 %    O2 Device/Flow/% Room [employer] methamphetamine, benzo, THC)    History of incarceration    Hepatitis C antibody test positive    Tobacco use       Plan:  Seizures likely secondary to withdrawal from polysubstance use     - Patient Tfx[facility] from [facility] for multiple seizures with concern for airway protection    - S/p 3 seizures given 8mg total of ativan and 1g loading dose of Keppra     - S/p scheduled librium for possible withdrawal     - UDS + for cocaine, heroin, methamphetamine, amphetamine, THC     - Alcohol level < 10    - Tmax of 100.8 but I do not see any temperature in her records    - CK of 39    - Chest x-ray showed no acute infiltrates or pneumothorax    - CT head without contrast and EEG ordered at [facility] but no reports sent with patient   - Patient is afebrile, with WBC 6.4, Hgb 10.9   - Last used xanax and heroin 9/11/18 as per patient   - Patient arousable and follows commands, is very drowsy    - ABGs stable with pH 7.460, pCO2 36.9, HCO3 26.3   - LA 1.1   - EKG NSR and unchanged from previous EKG   - Pending Blood culture, UA, Urine culture, MRSA nasal swab    - Pending CMP with reflex to Mag, TSH, Myoglobin   - IVF NS 1L bolus with maintenance fluids 125ml/hour   - Banana bag with thiamine and folate 1L ordered   - Seizure precautions    - Will start Keppra 500mg IV [id number]    - Neurology consulted- Spoke with Dr. Kathryn Freire- EEG technician called    - MRI brain WO contrast ordered     - Will await for further recommendations    - On chart review- no neurology visits    - NPO for now    Microcytic hypochromic anemia    - Hgb 10.9 with MCV 80   - [facility] labs:  Folate was 10.7 and vitamin B12 was 621   - Patient denies any dizziness, lightheadedness, hematemesis, hematochezia, or melena    Hepatitis C antibody test positive   [facility] labs: LFTs elevated at ALT//62   - PT 11.1/ INR 1.0   - CMP pending this AM   - Hepatitis panel 5/16/18 Reactive for Hep C Ab   - Hepatitis panel 9/11/18 weakly

## 2018-09-15 LAB
CULTURE: NORMAL
Lab: NORMAL
SPECIMEN DESCRIPTION: NORMAL
STATUS: NORMAL

## 2018-09-19 ENCOUNTER — HOSPITAL ENCOUNTER (INPATIENT)
Age: 28
LOS: 4 days | Discharge: HOME | DRG: 053 | End: 2018-09-23
Attending: EMERGENCY MEDICINE | Admitting: INTERNAL MEDICINE
Payer: MEDICAID

## 2018-09-19 ENCOUNTER — APPOINTMENT (OUTPATIENT)
Dept: CT IMAGING | Age: 28
DRG: 053 | End: 2018-09-19
Payer: MEDICAID

## 2018-09-19 DIAGNOSIS — R56.9 SEIZURE (HCC): Primary | ICD-10-CM

## 2018-09-19 LAB
ABSOLUTE EOS #: 0.1 K/UL (ref 0–0.4)
ABSOLUTE IMMATURE GRANULOCYTE: ABNORMAL K/UL (ref 0–0.3)
ABSOLUTE LYMPH #: 2.4 K/UL (ref 1–4.8)
ABSOLUTE MONO #: 0.9 K/UL (ref 0.1–1.3)
ALBUMIN SERPL-MCNC: 3.7 G/DL (ref 3.5–5.2)
ALBUMIN/GLOBULIN RATIO: ABNORMAL (ref 1–2.5)
ALP BLD-CCNC: 129 U/L (ref 35–104)
ALT SERPL-CCNC: 40 U/L (ref 5–33)
AMPHETAMINE SCREEN URINE: NEGATIVE
ANION GAP SERPL CALCULATED.3IONS-SCNC: 13 MMOL/L (ref 9–17)
AST SERPL-CCNC: 25 U/L
BARBITURATE SCREEN URINE: NEGATIVE
BASOPHILS # BLD: 1 % (ref 0–2)
BASOPHILS ABSOLUTE: 0.1 K/UL (ref 0–0.2)
BENZODIAZEPINE SCREEN, URINE: POSITIVE
BILIRUB SERPL-MCNC: <0.15 MG/DL (ref 0.3–1.2)
BUN BLDV-MCNC: 16 MG/DL (ref 6–20)
BUN/CREAT BLD: ABNORMAL (ref 9–20)
BUPRENORPHINE URINE: ABNORMAL
CALCIUM SERPL-MCNC: 9 MG/DL (ref 8.6–10.4)
CANNABINOID SCREEN URINE: POSITIVE
CHLORIDE BLD-SCNC: 104 MMOL/L (ref 98–107)
CO2: 25 MMOL/L (ref 20–31)
COCAINE METABOLITE, URINE: POSITIVE
CREAT SERPL-MCNC: 0.59 MG/DL (ref 0.5–0.9)
CULTURE: NORMAL
CULTURE: NORMAL
DIFFERENTIAL TYPE: ABNORMAL
EOSINOPHILS RELATIVE PERCENT: 1 % (ref 0–4)
ETHANOL PERCENT: <0.01 %
ETHANOL: <10 MG/DL
GFR AFRICAN AMERICAN: >60 ML/MIN
GFR NON-AFRICAN AMERICAN: >60 ML/MIN
GFR SERPL CREATININE-BSD FRML MDRD: ABNORMAL ML/MIN/{1.73_M2}
GFR SERPL CREATININE-BSD FRML MDRD: ABNORMAL ML/MIN/{1.73_M2}
GLUCOSE BLD-MCNC: 115 MG/DL (ref 70–99)
HCG QUALITATIVE: NEGATIVE
HCT VFR BLD CALC: 32.7 % (ref 36–46)
HEMOGLOBIN: 10.5 G/DL (ref 12–16)
IMMATURE GRANULOCYTES: ABNORMAL %
LIPASE: 56 U/L (ref 13–60)
LYMPHOCYTES # BLD: 24 % (ref 24–44)
Lab: NORMAL
Lab: NORMAL
MCH RBC QN AUTO: 24.8 PG (ref 26–34)
MCHC RBC AUTO-ENTMCNC: 32.1 G/DL (ref 31–37)
MCV RBC AUTO: 77.4 FL (ref 80–100)
MDMA URINE: ABNORMAL
METHADONE SCREEN, URINE: NEGATIVE
METHAMPHETAMINE, URINE: ABNORMAL
MONOCYTES # BLD: 10 % (ref 1–7)
NRBC AUTOMATED: ABNORMAL PER 100 WBC
OPIATES, URINE: POSITIVE
OXYCODONE SCREEN URINE: NEGATIVE
PDW BLD-RTO: 16.8 % (ref 11.5–14.9)
PHENCYCLIDINE, URINE: NEGATIVE
PLATELET # BLD: 326 K/UL (ref 150–450)
PLATELET ESTIMATE: ABNORMAL
PMV BLD AUTO: 7.4 FL (ref 6–12)
POTASSIUM SERPL-SCNC: 3.9 MMOL/L (ref 3.7–5.3)
PROLACTIN: 4.93 UG/L (ref 4.79–23.3)
PROPOXYPHENE, URINE: ABNORMAL
RBC # BLD: 4.23 M/UL (ref 4–5.2)
RBC # BLD: ABNORMAL 10*6/UL
SEG NEUTROPHILS: 64 % (ref 36–66)
SEGMENTED NEUTROPHILS ABSOLUTE COUNT: 6.4 K/UL (ref 1.3–9.1)
SODIUM BLD-SCNC: 142 MMOL/L (ref 135–144)
SPECIMEN DESCRIPTION: NORMAL
SPECIMEN DESCRIPTION: NORMAL
STATUS: NORMAL
STATUS: NORMAL
TEST INFORMATION: ABNORMAL
TOTAL CK: 43 U/L (ref 26–192)
TOTAL PROTEIN: 7.6 G/DL (ref 6.4–8.3)
TRICYCLIC ANTIDEPRESSANTS, UR: ABNORMAL
WBC # BLD: 9.8 K/UL (ref 3.5–11)
WBC # BLD: ABNORMAL 10*3/UL

## 2018-09-19 PROCEDURE — 6360000002 HC RX W HCPCS: Performed by: EMERGENCY MEDICINE

## 2018-09-19 PROCEDURE — 6360000002 HC RX W HCPCS: Performed by: PSYCHIATRY & NEUROLOGY

## 2018-09-19 PROCEDURE — 2580000003 HC RX 258: Performed by: EMERGENCY MEDICINE

## 2018-09-19 PROCEDURE — 2060000000 HC ICU INTERMEDIATE R&B

## 2018-09-19 PROCEDURE — 84146 ASSAY OF PROLACTIN: CPT

## 2018-09-19 PROCEDURE — 90792 PSYCH DIAG EVAL W/MED SRVCS: CPT | Performed by: NURSE PRACTITIONER

## 2018-09-19 PROCEDURE — 85025 COMPLETE CBC W/AUTO DIFF WBC: CPT

## 2018-09-19 PROCEDURE — 36415 COLL VENOUS BLD VENIPUNCTURE: CPT

## 2018-09-19 PROCEDURE — 80053 COMPREHEN METABOLIC PANEL: CPT

## 2018-09-19 PROCEDURE — 80307 DRUG TEST PRSMV CHEM ANLYZR: CPT

## 2018-09-19 PROCEDURE — 99291 CRITICAL CARE FIRST HOUR: CPT | Performed by: INTERNAL MEDICINE

## 2018-09-19 PROCEDURE — 83690 ASSAY OF LIPASE: CPT

## 2018-09-19 PROCEDURE — 6360000002 HC RX W HCPCS: Performed by: FAMILY MEDICINE

## 2018-09-19 PROCEDURE — 96374 THER/PROPH/DIAG INJ IV PUSH: CPT

## 2018-09-19 PROCEDURE — 2580000003 HC RX 258: Performed by: NURSE PRACTITIONER

## 2018-09-19 PROCEDURE — 84703 CHORIONIC GONADOTROPIN ASSAY: CPT

## 2018-09-19 PROCEDURE — 70450 CT HEAD/BRAIN W/O DYE: CPT

## 2018-09-19 PROCEDURE — G0480 DRUG TEST DEF 1-7 CLASSES: HCPCS

## 2018-09-19 PROCEDURE — 82550 ASSAY OF CK (CPK): CPT

## 2018-09-19 PROCEDURE — 6360000002 HC RX W HCPCS: Performed by: NURSE PRACTITIONER

## 2018-09-19 PROCEDURE — 99285 EMERGENCY DEPT VISIT HI MDM: CPT

## 2018-09-19 PROCEDURE — 2580000003 HC RX 258: Performed by: PSYCHIATRY & NEUROLOGY

## 2018-09-19 PROCEDURE — 99254 IP/OBS CNSLTJ NEW/EST MOD 60: CPT | Performed by: PSYCHIATRY & NEUROLOGY

## 2018-09-19 RX ORDER — NICOTINE 21 MG/24HR
1 PATCH, TRANSDERMAL 24 HOURS TRANSDERMAL DAILY PRN
Status: DISCONTINUED | OUTPATIENT
Start: 2018-09-19 | End: 2018-09-23 | Stop reason: HOSPADM

## 2018-09-19 RX ORDER — BISACODYL 10 MG
10 SUPPOSITORY, RECTAL RECTAL DAILY PRN
Status: DISCONTINUED | OUTPATIENT
Start: 2018-09-19 | End: 2018-09-23 | Stop reason: HOSPADM

## 2018-09-19 RX ORDER — SODIUM CHLORIDE 0.9 % (FLUSH) 0.9 %
10 SYRINGE (ML) INJECTION EVERY 12 HOURS SCHEDULED
Status: DISCONTINUED | OUTPATIENT
Start: 2018-09-19 | End: 2018-09-23 | Stop reason: HOSPADM

## 2018-09-19 RX ORDER — SODIUM CHLORIDE 0.9 % (FLUSH) 0.9 %
10 SYRINGE (ML) INJECTION PRN
Status: DISCONTINUED | OUTPATIENT
Start: 2018-09-19 | End: 2018-09-20 | Stop reason: SDUPTHER

## 2018-09-19 RX ORDER — LORAZEPAM 2 MG/ML
1 INJECTION INTRAMUSCULAR EVERY 4 HOURS PRN
Status: DISCONTINUED | OUTPATIENT
Start: 2018-09-19 | End: 2018-09-23 | Stop reason: HOSPADM

## 2018-09-19 RX ORDER — LORAZEPAM 1 MG/1
4 TABLET ORAL
Status: DISCONTINUED | OUTPATIENT
Start: 2018-09-19 | End: 2018-09-19

## 2018-09-19 RX ORDER — ACETAMINOPHEN 325 MG/1
650 TABLET ORAL EVERY 4 HOURS PRN
Status: DISCONTINUED | OUTPATIENT
Start: 2018-09-19 | End: 2018-09-23 | Stop reason: HOSPADM

## 2018-09-19 RX ORDER — LORAZEPAM 1 MG/1
1 TABLET ORAL
Status: DISCONTINUED | OUTPATIENT
Start: 2018-09-19 | End: 2018-09-19

## 2018-09-19 RX ORDER — SODIUM CHLORIDE 0.9 % (FLUSH) 0.9 %
10 SYRINGE (ML) INJECTION PRN
Status: DISCONTINUED | OUTPATIENT
Start: 2018-09-19 | End: 2018-09-23 | Stop reason: HOSPADM

## 2018-09-19 RX ORDER — LORAZEPAM 1 MG/1
2 TABLET ORAL
Status: DISCONTINUED | OUTPATIENT
Start: 2018-09-19 | End: 2018-09-19

## 2018-09-19 RX ORDER — ONDANSETRON 2 MG/ML
4 INJECTION INTRAMUSCULAR; INTRAVENOUS EVERY 6 HOURS PRN
Status: DISCONTINUED | OUTPATIENT
Start: 2018-09-19 | End: 2018-09-23 | Stop reason: HOSPADM

## 2018-09-19 RX ORDER — LORAZEPAM 1 MG/1
3 TABLET ORAL
Status: DISCONTINUED | OUTPATIENT
Start: 2018-09-19 | End: 2018-09-19

## 2018-09-19 RX ORDER — LORAZEPAM 2 MG/ML
4 INJECTION INTRAMUSCULAR
Status: DISCONTINUED | OUTPATIENT
Start: 2018-09-19 | End: 2018-09-19

## 2018-09-19 RX ORDER — SODIUM CHLORIDE 0.9 % (FLUSH) 0.9 %
10 SYRINGE (ML) INJECTION EVERY 12 HOURS SCHEDULED
Status: DISCONTINUED | OUTPATIENT
Start: 2018-09-19 | End: 2018-09-20 | Stop reason: SDUPTHER

## 2018-09-19 RX ORDER — LORAZEPAM 2 MG/ML
1 INJECTION INTRAMUSCULAR ONCE
Status: COMPLETED | OUTPATIENT
Start: 2018-09-19 | End: 2018-09-19

## 2018-09-19 RX ORDER — LORAZEPAM 2 MG/ML
1 INJECTION INTRAMUSCULAR
Status: DISCONTINUED | OUTPATIENT
Start: 2018-09-19 | End: 2018-09-19

## 2018-09-19 RX ORDER — LORAZEPAM 2 MG/ML
2 INJECTION INTRAMUSCULAR
Status: DISCONTINUED | OUTPATIENT
Start: 2018-09-19 | End: 2018-09-19

## 2018-09-19 RX ORDER — 0.9 % SODIUM CHLORIDE 0.9 %
1000 INTRAVENOUS SOLUTION INTRAVENOUS ONCE
Status: COMPLETED | OUTPATIENT
Start: 2018-09-19 | End: 2018-09-19

## 2018-09-19 RX ORDER — SODIUM CHLORIDE 9 MG/ML
INJECTION, SOLUTION INTRAVENOUS CONTINUOUS
Status: DISCONTINUED | OUTPATIENT
Start: 2018-09-19 | End: 2018-09-23 | Stop reason: HOSPADM

## 2018-09-19 RX ORDER — LORAZEPAM 2 MG/ML
3 INJECTION INTRAMUSCULAR
Status: DISCONTINUED | OUTPATIENT
Start: 2018-09-19 | End: 2018-09-19

## 2018-09-19 RX ORDER — AMMONIA INHALANTS 0.04 G/.3ML
INHALANT RESPIRATORY (INHALATION)
Status: DISPENSED
Start: 2018-09-19 | End: 2018-09-19

## 2018-09-19 RX ADMIN — SODIUM CHLORIDE: 9 INJECTION, SOLUTION INTRAVENOUS at 05:59

## 2018-09-19 RX ADMIN — LORAZEPAM 1 MG: 2 INJECTION INTRAMUSCULAR; INTRAVENOUS at 15:35

## 2018-09-19 RX ADMIN — LORAZEPAM 1 MG: 2 INJECTION INTRAMUSCULAR; INTRAVENOUS at 19:55

## 2018-09-19 RX ADMIN — ONDANSETRON 4 MG: 2 INJECTION INTRAMUSCULAR; INTRAVENOUS at 23:52

## 2018-09-19 RX ADMIN — LORAZEPAM 1 MG: 2 INJECTION INTRAMUSCULAR; INTRAVENOUS at 04:05

## 2018-09-19 RX ADMIN — LORAZEPAM 1 MG: 2 INJECTION INTRAMUSCULAR; INTRAVENOUS at 23:44

## 2018-09-19 RX ADMIN — LORAZEPAM 2 MG: 2 INJECTION INTRAMUSCULAR; INTRAVENOUS at 10:11

## 2018-09-19 RX ADMIN — SODIUM CHLORIDE 1000 ML: 9 INJECTION, SOLUTION INTRAVENOUS at 04:05

## 2018-09-19 RX ADMIN — Medication 10 ML: at 20:20

## 2018-09-19 ASSESSMENT — ENCOUNTER SYMPTOMS
DIARRHEA: 0
VOMITING: 0
BLURRED VISION: 0
BACK PAIN: 1
DOUBLE VISION: 0
SHORTNESS OF BREATH: 0
COUGH: 0
SPUTUM PRODUCTION: 0
WHEEZING: 0
ORTHOPNEA: 0
ABDOMINAL PAIN: 0
NAUSEA: 0
SORE THROAT: 0
CONSTIPATION: 0

## 2018-09-19 ASSESSMENT — PAIN SCALES - GENERAL
PAINLEVEL_OUTOF10: 0
PAINLEVEL_OUTOF10: 0
PAINLEVEL_OUTOF10: 8
PAINLEVEL_OUTOF10: 0
PAINLEVEL_OUTOF10: 0

## 2018-09-19 NOTE — ED NOTES
Pt had a seizure lasting approximately 1 minute. This RN at the bedside. Pt's body tensed up but no convulsions noted. Pt's HR increased to 124. Pt alert and oriented afterwards. No post ictal phase noted.        Colleen Plascencia RN  09/19/18 [id number]

## 2018-09-19 NOTE — PLAN OF CARE
Problem: Nutrition  Goal: Optimal nutrition therapy  Outcome: Ongoing  Nutrition Problem: Inadequate oral intake  Intervention: Food and/or Nutrient Delivery: Continue current diet, Start ONS  Nutritional Goals: Adequate nutrition intake or provision

## 2018-09-19 NOTE — PROGRESS NOTES
RN walked into the room and patient was having a seizure. Patient tensed up ans had small convulsions during. Patients mouth was suctioned and was able to answer questions directly after.  Vitals signs stable

## 2018-09-19 NOTE — PROGRESS NOTES
Pt demanding to get out of bed to use the bathroom. Pt has been having seizure like activity as frequently as every few minutes and demanding more Ativan, residents called to room frequently. Orders for bedrest due to high risk for injury from falls/ Pt climbed over side rails and walked to bathroom while yelling \"I'm Lewis Shultz go to a real hospital where they will give me what I need. \" Pt then went into the bathroom and locked the door. Upon emerging from the bathroom, pt insists she is leaving AMA. Residents on unit.

## 2018-09-19 NOTE — CONSULTS
resided with her mother and has 2 children who are not in her custody. 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       Family Psychiatric History:  Patient states her mother has been treated for bipolar disorder    Family History:   Family History   Problem Relation Age of Onset    Diabetes Mother         Grandparents    Cancer Other         Breast, great aunt         Physical  BP (!) 142/77   Pulse 81   Temp 98 °F (36.7 °C) (Axillary)   Resp 22   Ht 5' 2\" (1.575 m)   Wt 121 lb 14.6 oz (55.3 kg)   LMP 08/01/2018 (Approximate)   SpO2 100%   BMI 22.30 kg/m²     MENTAL STATUS EXAM:  Level of consciousness:  awake  Appearance:  well-appearing, street clothes and seated in bed  Behavior/Motor:  no abnormalities noted  Attitude toward examiner:  cooperative  Speech:  spontaneous, normal rate and normal volume  Mood:  Depressed 8/10  Affect:  tearful  Thought processes:  linear, goal directed and coherent  Thought content: logical, expresses a desire to stop using  Suicidal Ideation:  Denies current suicidal thoughts  Delusions:  Endorses recent paranoia  Perceptual Disturbance: denies auditory or visual hallucinations  Cognition:  Attention and concentration mildly diminished, oriented ×4  Memory: intact  Insight & Judgement:fair    DSM-V DIAGNOSIS:      Bipolar disorder by history  Opiate use disorder, severe  Benzodiazepine use disorder, severe  Alcohol use disorder, severe  Cocaine use disorder, severe  Stimulant use disorder, severe      General Medical Condition (Axis III):      Patient Active Problem List   Diagnosis Code    Depression with history of suicide attempt in 2014 F32.9    Bipolar disorder ([facility] [address] F31.9    Seizures ([facility] [address] R56.9    IV drug user F19.90    Polysubstance abuse (Alcohol, Cocaine, heroin, methamphetamine, benzo, THC) F19.10    History of incarceration Z91.89    Hepatitis C antibody test positive R76.8    Tobacco use Z72.0    Psychogenic nonepileptic seizure

## 2018-09-19 NOTE — PROGRESS NOTES
Pt transferred to ICU 13. Pt hooked to monitor. Pt VSS and patient drowsy.  Electronically signed by Gerry Silverman RN on 9/19/2018 at 12:42 PM

## 2018-09-19 NOTE — PROGRESS NOTES
Patient had another seizure, DR. Destiny Garza notified and she went in the room and witnessed.  New orders for CIWA scale given for withdrawal.

## 2018-09-19 NOTE — PROGRESS NOTES
Telesitter called and notified RN that patient was seizing again. MD on unit and came to room also. Pt on side with rigid extremities, pt withdrew from nail bed pressure, VSS, seizure lasted about 45 sec.

## 2018-09-19 NOTE — PROGRESS NOTES
Supplement Intake, Diet Tolerance, Nausea or Vomiting, Mental Status/Confusion, Weight, Pertinent Labs    See Adult Nutrition Doc Flowsheet for more detail. ANSELMO Topete R.D..   Clinical Dietitian  Pager: [phone]

## 2018-09-19 NOTE — CONSULTS
CQ) 21 MG/24HR 1 patch  1 patch Transdermal Daily PRN Etheleen Corrente, APRN - CNP        enoxaparin (LOVENOX) injection 40 mg  40 mg Subcutaneous Daily Etheleen Corrente, APRN - CNP        acetaminophen (TYLENOL) tablet 650 mg  650 mg Oral Q4H PRN Etheleen Corrente, APRN - CNP            ALLERGIES:     Allergies   Allergen Reactions    Haldol [Haloperidol Lactate] Other (See Comments)     Acute Dystonia         REVIEW OF SYSTEMS:      CONSTITUTIONAL Weight change: absent, Appetite change: absent, Fatigue: present    HEENT Ears: normal, Visual disturbance: absent    RESPIRATORY Shortness of breath: absent, Cough: absent    CARDIOVASCULAR Chest pain: absent, Leg swelling :absent    GI Constipation: absent, Diarrhea: absent, Swallowing change: absent     Urinary frequency: absent, Urinary urgency: absent, Urinary incontinence: absent    MUSCULOSKELETAL Neck pain: absent, Back pain: present, Stiffness: absent, Muscle pain: absent, Joint pain: absent Restless legs: absent    DERMATOLOGIC Hair loss: absent, Skin changes: absent    NEUROLOGIC Memory loss: absent, Confusion: absent, Seizures: present Trouble walking or imbalance: absent, Dizziness: absent, Weakness: absent, Numbness: absent Tremor: absent, Spasm: absent, Speech difficulty: absent, Headache: absent, Light sensitivity: absent    PSYCHIATRIC Anxiety: present, Hallucination: absent, Mood disorder: present    HEMATOLOGIC Abnormal bleeding: absent, Anemia: present, Clotting disorder: absent, Lymph gland changes: absent     VITALS  /66   Pulse 71   Temp 97.8 °F (36.6 °C) (Oral)   Resp 14   Ht 5' 2\" (1.575 m)   Wt 121 lb 14.6 oz (55.3 kg)   LMP 08/01/2018 (Approximate)   SpO2 100%   BMI 22.30 kg/m²        PHYSICAL EXAMINATIONS:     General appearance: very sleepy, needed waking, partially cooperative,  Skin: no rash or skin lesions.   HEENT: normocephalic  Optic Fundi: deferred  Neck: supple, no cervcical adenopathy or carotid bruit  Lungs: clear to auscultation  Heart: Regular rate and rhythm, normal S1, S2. No murmurs, clicks or gallops. Peripheral pulses: radial pulses palpable  Abdominal: BS present, soft, NT, ND  Extremities: no edema    NEUROLOGICAL EXAMINATION:     MS: sleepy, arousable, oriented. No aphasia, dysarthria, or neglect  CNs: PERRLA, EOMI, VF full, sensation intact, face symmetric, hearing intact, soft palate rises on phonation, sternocleidomastoid and trapezius intact. Tongue midline, no fasciculations. Motor: no abnormal movements, tone and bulk okay. Moves all extremities  Reflexes: 2+ throughout, symmetric, babinski not present. Coordination: FNF no dysmetria, refused heel to shin okay, YVAN okay, Rhomberg not tested. Gait: refused, Tandem not tested  Sensory: Normal to light touch/temp/pp/vibration, intact joint position sense, no extinction.     ASSESSMENT/PLAN:     // Seizures  - most likely provoked  - history of daily substance use, only did not use yesterday  - CTH unremarkable, prolonged EEG on 9/13/2018: diffuse encephalopathy  - even though provoked seizures, given her high risk of recurrent seizures in setting substance, would like to put her on AED  - check UDS  - no need repeat EEG at this time  - significant psychiatric problems, keppra is not good for her,   - slightly elevated liver enzyme, plus daily drinker, will not put dilantin on   - lamictal is a good option for her for both seizures and mood issues but has have started at very low dose, warning side effects of skin rash, compliance issue  - can start at 25mg daily for 1 week, then weekly increase 25mg to reach 100 mg BID  Week 1: 25mg daily  Week 2: 25mg BID  Week 3: 50mg morning, 25mg evening  Week 4: 50mg BID  Week 5: 75mg morning, 50mg evening  Week 6: 75mg BID  Week 7: 100mg morning, 75mg evening  From week 8: 100mg BID  If develop rashes, go to ED  - seizure precaution  - no driving for 6 months    // Depression, anxiety, substance use  - consult [place] psychiatrist as outpt  - consult psychologist Dr. Dayan Thakkar (at [facility]) for outpt follow up    Pt needs establish care with PCP. Pt can follow up with neurology in neurology clinic in 2-3 weeks.        Thanks for this consult, please call us with any questions      Destiny Garza MD, MS

## 2018-09-19 NOTE — H&P
[facility]     HISTORY AND PHYSICAL EXAMINATION            Date:   9/19/2018  Patient name:  Dariela Monroy  Date of admission:  9/19/2018  3:23 AM  MRN:   [id number]  Account:  [de-identified]  YOB: 1990  PCP:    No primary care provider on file. Room:   [address] [place] [address]  Code Status:    Prior    CHIEF COMPLAINT     Chief Complaint   Patient presents with    Seizures     HISTORY OF PRESENT ILLNESS  (Character, Onset, Location, Duration,  Exacerbating/Relieving Factors, Radiation,   Associated Symptoms, Severity )      The patient is a 28 y.o.  female who presents with seizure activity. According to patient, she has history of daily drug and alcohol abuse. Reports that she currently drinks 2 Fifths of Michelle daily and buys benzos and other drugs on the streets. Patient reports that she has seizures when she does not have enough alcohol and benzos. Symptoms are associated with polysubstance abuse. Denies fever, chills, chest pain, cough, abdominal pain, nausea, vomiting, diarrhea, and urinary symptoms. Symptoms are aggravated by withdrawal of drugs and alcohol. Reports that she has not had any benzos or heroin today, which is causing seizure activity. There are no alleviating factors. Symptoms are reported as constant and moderate to severe. PAST MEDICAL HISTORY   Patient  has a past medical history of Gall stones; Psychiatric problem; Seizure ([address] and Substance abuse. PAST SURGICAL HISTORY    Patient  has a past surgical history that includes Gallbladder surgery and Cholecystectomy. FAMILY HISTORY    Patient family history includes Cancer in an other family member; Diabetes in her mother. SOCIAL HISTORY    Patient  reports that she has been smoking Cigarettes. She has a 1.50 pack-year smoking history. She has never used smokeless tobacco. She reports that she uses drugs, including Marijuana, IV, and Cocaine.  She reports that she does wheezes. She has no rales. She exhibits no tenderness. Abdominal: Soft. Bowel sounds are normal. She exhibits no distension. There is no tenderness. There is no guarding. Musculoskeletal: Normal range of motion. She exhibits no edema or tenderness. Lymphadenopathy:     She has no cervical adenopathy. Neurological: She is alert and oriented to person, place, and time. Skin: Skin is warm and dry. No rash noted. She is not diaphoretic. No erythema. No pallor. Psychiatric: She has a normal mood and affect. Her behavior is normal. Thought content normal.     DIAGNOSTICS      EKG: none    Labs:  CBC:   Recent Labs      09/19/18 0350   WBC  9.8   HGB  10.5*   PLT  326     BMP:    Recent Labs      09/19/18 0350   NA  142   K  3.9   CL  104   CO2  25   BUN  16   CREATININE  0.59   GLUCOSE  115*     S. Calcium:  Recent Labs      09/19/18 0350   CALCIUM  9.0     S. Ionized Calcium:No results for input(s): IONCA in the last 72 hours. S. Magnesium:No results for input(s): MG in the last 72 hours. S. Phosphorus:No results for input(s): PHOS in the last 72 hours. S. Glucose:No results for input(s): POCGLU in the last 72 hours. Glycosylated hemoglobin A1C: No results found for: LABA1C  Hepatic:   Recent Labs      09/19/18 0350   AST  25   ALT  40*     CARDIAC ENZY: No results for input(s): CKTOTAL, CKMB, CKMBINDEX, TROPONINT, MYOGLOBIN in the last 72 hours. INR: No results for input(s): INR in the last 72 hours. BNP: No results for input(s): BNP in the last 72 hours. Invalid input(s):  PROBNP  ABGs: No results for input(s): PH, PCO2, PO2, HCO3, O2SAT in the last 72 hours. Lipids: No results for input(s): CHOL, TRIG, HDL, LDLCALC in the last 72 hours. Invalid input(s): LDL  Pancreatic functions:  Recent Labs      09/19/18 0350   LIPASE  56     S. Lactic Acid: No results for input(s): LACTA in the last 72 hours.   Thyroid functions:   Lab Results   Component Value Date    TSH 0.76 09/13/2018

## 2018-09-19 NOTE — PROGRESS NOTES
Was told pt had a seizure, I went to pt's bed, pt was oriented to time/person/place. Continue monitoring. 1. Seizure precaution  2. Ativan 1mg IV at time when seizure lasts more than 3 minutes, or more than 3 seizures a day.    3. ETOH and benzo withdrawal check and precaution    Shani Rivera MD, [address] [place] [address], Neurology  Tel: [phone]

## 2018-09-19 NOTE — PLAN OF CARE
Problem: Health Behavior:  Goal: Ability to manage health-related needs will improve  Ability to manage health-related needs will improve  Outcome: Ongoing  Pt has psych consult this shift      Problem: Safety:  Goal: Ability to remain free from injury will improve  Ability to remain free from injury will improve  Outcome: Ongoing  Pt remains free of falls this shift.  Approprate safety measures in place

## 2018-09-20 LAB
ALBUMIN SERPL-MCNC: 3.3 G/DL (ref 3.5–5.2)
ALBUMIN/GLOBULIN RATIO: ABNORMAL (ref 1–2.5)
ALP BLD-CCNC: 107 U/L (ref 35–104)
ALT SERPL-CCNC: 34 U/L (ref 5–33)
ANION GAP SERPL CALCULATED.3IONS-SCNC: 11 MMOL/L (ref 9–17)
AST SERPL-CCNC: 21 U/L
BILIRUB SERPL-MCNC: <0.15 MG/DL (ref 0.3–1.2)
BUN BLDV-MCNC: 10 MG/DL (ref 6–20)
BUN/CREAT BLD: ABNORMAL (ref 9–20)
CALCIUM SERPL-MCNC: 8.4 MG/DL (ref 8.6–10.4)
CHLORIDE BLD-SCNC: 107 MMOL/L (ref 98–107)
CO2: 24 MMOL/L (ref 20–31)
CREAT SERPL-MCNC: 0.63 MG/DL (ref 0.5–0.9)
GFR AFRICAN AMERICAN: >60 ML/MIN
GFR NON-AFRICAN AMERICAN: >60 ML/MIN
GFR SERPL CREATININE-BSD FRML MDRD: ABNORMAL ML/MIN/{1.73_M2}
GFR SERPL CREATININE-BSD FRML MDRD: ABNORMAL ML/MIN/{1.73_M2}
GLUCOSE BLD-MCNC: 109 MG/DL (ref 70–99)
HCT VFR BLD CALC: 32.2 % (ref 36–46)
HEMOGLOBIN: 10.3 G/DL (ref 12–16)
INR BLD: 1.1
MCH RBC QN AUTO: 24.6 PG (ref 26–34)
MCHC RBC AUTO-ENTMCNC: 31.9 G/DL (ref 31–37)
MCV RBC AUTO: 77.3 FL (ref 80–100)
NRBC AUTOMATED: ABNORMAL PER 100 WBC
PDW BLD-RTO: 17.1 % (ref 11.5–14.9)
PLATELET # BLD: 302 K/UL (ref 150–450)
PMV BLD AUTO: 7.2 FL (ref 6–12)
POTASSIUM SERPL-SCNC: 3.8 MMOL/L (ref 3.7–5.3)
PROTHROMBIN TIME: 11.3 SEC (ref 9.7–12)
RBC # BLD: 4.17 M/UL (ref 4–5.2)
SODIUM BLD-SCNC: 142 MMOL/L (ref 135–144)
TOTAL PROTEIN: 7 G/DL (ref 6.4–8.3)
WBC # BLD: 8.9 K/UL (ref 3.5–11)

## 2018-09-20 PROCEDURE — 6370000000 HC RX 637 (ALT 250 FOR IP): Performed by: INTERNAL MEDICINE

## 2018-09-20 PROCEDURE — 6360000002 HC RX W HCPCS: Performed by: FAMILY MEDICINE

## 2018-09-20 PROCEDURE — 94762 N-INVAS EAR/PLS OXIMTRY CONT: CPT

## 2018-09-20 PROCEDURE — 85610 PROTHROMBIN TIME: CPT

## 2018-09-20 PROCEDURE — 2060000000 HC ICU INTERMEDIATE R&B

## 2018-09-20 PROCEDURE — 6370000000 HC RX 637 (ALT 250 FOR IP): Performed by: NURSE PRACTITIONER

## 2018-09-20 PROCEDURE — 36415 COLL VENOUS BLD VENIPUNCTURE: CPT

## 2018-09-20 PROCEDURE — 6370000000 HC RX 637 (ALT 250 FOR IP): Performed by: FAMILY MEDICINE

## 2018-09-20 PROCEDURE — 99233 SBSQ HOSP IP/OBS HIGH 50: CPT | Performed by: PSYCHIATRY & NEUROLOGY

## 2018-09-20 PROCEDURE — 6360000002 HC RX W HCPCS: Performed by: NURSE PRACTITIONER

## 2018-09-20 PROCEDURE — 80053 COMPREHEN METABOLIC PANEL: CPT

## 2018-09-20 PROCEDURE — 85027 COMPLETE CBC AUTOMATED: CPT

## 2018-09-20 PROCEDURE — 2580000003 HC RX 258: Performed by: NURSE PRACTITIONER

## 2018-09-20 PROCEDURE — 99233 SBSQ HOSP IP/OBS HIGH 50: CPT | Performed by: INTERNAL MEDICINE

## 2018-09-20 RX ORDER — LAMOTRIGINE 25 MG/1
25 TABLET ORAL DAILY
Status: DISCONTINUED | OUTPATIENT
Start: 2018-09-20 | End: 2018-09-23 | Stop reason: HOSPADM

## 2018-09-20 RX ORDER — AMOXICILLIN 500 MG/1
500 CAPSULE ORAL EVERY 8 HOURS SCHEDULED
Status: DISCONTINUED | OUTPATIENT
Start: 2018-09-20 | End: 2018-09-23 | Stop reason: HOSPADM

## 2018-09-20 RX ADMIN — ENOXAPARIN SODIUM 40 MG: 40 INJECTION SUBCUTANEOUS at 10:20

## 2018-09-20 RX ADMIN — ACETAMINOPHEN 650 MG: 325 TABLET, FILM COATED ORAL at 12:14

## 2018-09-20 RX ADMIN — LORAZEPAM 1 MG: 2 INJECTION INTRAMUSCULAR; INTRAVENOUS at 21:55

## 2018-09-20 RX ADMIN — LORAZEPAM 1 MG: 2 INJECTION INTRAMUSCULAR; INTRAVENOUS at 14:34

## 2018-09-20 RX ADMIN — AMOXICILLIN 500 MG: 500 CAPSULE ORAL at 21:56

## 2018-09-20 RX ADMIN — LAMOTRIGINE 25 MG: 25 TABLET ORAL at 10:19

## 2018-09-20 RX ADMIN — SODIUM CHLORIDE: 9 INJECTION, SOLUTION INTRAVENOUS at 11:20

## 2018-09-20 RX ADMIN — AMOXICILLIN 500 MG: 500 CAPSULE ORAL at 16:42

## 2018-09-20 RX ADMIN — Medication 10 ML: at 21:56

## 2018-09-20 RX ADMIN — LORAZEPAM 1 MG: 2 INJECTION INTRAMUSCULAR; INTRAVENOUS at 05:57

## 2018-09-20 RX ADMIN — ONDANSETRON 4 MG: 2 INJECTION INTRAMUSCULAR; INTRAVENOUS at 22:09

## 2018-09-20 ASSESSMENT — ENCOUNTER SYMPTOMS
ABDOMINAL PAIN: 0
CONSTIPATION: 0
EYE REDNESS: 0
VOMITING: 0
BLURRED VISION: 0
NAUSEA: 0
DOUBLE VISION: 0
WHEEZING: 0
EYE PAIN: 0
BACK PAIN: 1
BACK PAIN: 0
DIARRHEA: 0
RHINORRHEA: 0
COUGH: 0
SHORTNESS OF BREATH: 0
ORTHOPNEA: 0
SPUTUM PRODUCTION: 0
SORE THROAT: 0

## 2018-09-20 ASSESSMENT — PAIN SCALES - GENERAL
PAINLEVEL_OUTOF10: 9
PAINLEVEL_OUTOF10: 0
PAINLEVEL_OUTOF10: 9
PAINLEVEL_OUTOF10: 6
PAINLEVEL_OUTOF10: 0
PAINLEVEL_OUTOF10: 0

## 2018-09-20 ASSESSMENT — PAIN DESCRIPTION - LOCATION
LOCATION: TEETH
LOCATION: BACK
LOCATION: TEETH

## 2018-09-20 ASSESSMENT — PAIN DESCRIPTION - PAIN TYPE
TYPE: ACUTE PAIN
TYPE: ACUTE PAIN
TYPE: CHRONIC PAIN

## 2018-09-20 NOTE — PROGRESS NOTES
patient had a tonic- clonic seizure. Patient had no nail bed pressure response. PRN ativan given. Seizure lasted 3min and 55sec. Neuro assessment completed.   Electronically signed by Aleks Ascencio RN on 9/20/2018 at 2:39 PM

## 2018-09-20 NOTE — FLOWSHEET NOTE
09/20/18 1440   Encounter Summary   Services provided to: Patient   Referral/Consult From: [facility] (Mary present)   Continue Visiting (9/20/18)   Complexity of Encounter Low   Length of Encounter 15 minutes   Routine   Type Initial   Assessment Approachable  (Mary stated patient \"just had seizure\")   Intervention Prayer;Sustaining presence/ Ministry of presence   Outcome Expressed gratitude;Expressed feelings/needs/concerns;Receptive  (Patient requested prayer)

## 2018-09-20 NOTE — PROGRESS NOTES
RN called into room by guard and witnessed more seizure-like activity. Guard and RN talking about medications, patient woke and asked for ativan. Notified that ativan could not be given yet.

## 2018-09-20 NOTE — PROGRESS NOTES
RN entered room to find patient having seizure-like activity, lasting 5 minutes, HR up to 140 before returning to baseline. Patient responding to commands during this episode.

## 2018-09-20 NOTE — CARE COORDINATION
CASE MANAGEMENT NOTE:    Admission Date:  9/19/2018 Fozia Adames is a 28 y.o.  female    Admitted for : Seizure ([facility] [R56.9]  Seizure ([facility] [R56.9]  Seizure ([facility] [R56.9]    Met with:  Chart Notes    PCP:  Does not have one - Will have [facility] address this at discharge                                Insurance:  [address]      Current Residence/ Living Arrangements:  independently at home             Current Services PTA:  No    Is patient agreeable to VNS: No    Freedom of choice provided: NA    List of [address] provided: No    VNS chosen:  No    DME:  none    Home Oxygen: No    Nebulizer: No    CPAP/BIPAP: No    Supplier: N/A    Potential Assistance Needed: Yes - Will go to [facility] at discharge    SNF needed: No    Pharmacy:  [address] [place] [address] Road       Is the Patient an [employer] with Readmission Risk Score greater than 14%? No  If yes, pt needs a follow up appointment made within 7 days. Does Patient want to use MEDS to BEDS? No    Family Members/Caregivers that pt would like involved in their care:    Yes    If yes, list name here:  [address]    Transportation Provider:  Family                      Discharge Plan:  9/20: [address] - Patient is from [facility] with family. IV heroin user for 9 years. Daily drinker. Suicidal - [place] to [facility]. Will continue to follow.  //AMARILYS                 Electronically signed by: Be Pena RN on 9/20/2018 at 10:46 AM

## 2018-09-20 NOTE — PROGRESS NOTES
RN entered room, patient appeared to have seizure like activity. Patient found on her side facing door, eyes looking at writer, arms rigid, hands in claw like positioning convulsing, HR in 110s. Seizure activity lasted 3 minutes, ativan 1mg given as ordered.  Patient is now alert and oriented

## 2018-09-20 NOTE — H&P
she does not drink alcohol. HOME MEDICATIONS        Prior to Admission medications    Not on File     ALLERGIES      Haldol [haloperidol lactate]    REVIEW OF SYSTEMS     Review of Systems   Constitutional: Negative for chills, diaphoresis, fever and malaise/fatigue. HENT: Negative for congestion and sore throat. Eyes: Negative for blurred vision and double vision. Respiratory: Negative for cough, sputum production, shortness of breath and wheezing. Cardiovascular: Negative for chest pain, palpitations, orthopnea and leg swelling. Gastrointestinal: Negative for abdominal pain, constipation, diarrhea, nausea and vomiting. Genitourinary: Negative for dysuria, frequency and urgency. Musculoskeletal: Positive for back pain. Negative for falls and myalgias. Skin: Negative for itching and rash. Neurological: Positive for seizures. Negative for dizziness, sensory change, focal weakness, weakness and headaches. Psychiatric/Behavioral: Positive for substance abuse. Negative for depression. The patient is not nervous/anxious. PHYSICAL EXAM      BP (!) 140/90   Pulse 73   Temp 98.9 °F (37.2 °C) (Oral)   Resp 24   Ht 5' 2\" (1.575 m)   Wt 121 lb 14.6 oz (55.3 kg)   LMP 08/01/2018 (Approximate)   SpO2 99%   BMI 22.30 kg/m²  Body mass index is 22.3 kg/m². Physical Exam   Constitutional: She is oriented to person, place, and time. She appears well-developed and well-nourished. No distress. sleepy   HENT:   Head: Normocephalic and atraumatic. Mouth/Throat: Oropharynx is clear and moist.   Eyes: Pupils are equal, round, and reactive to light. Conjunctivae and EOM are normal.   Neck: Normal range of motion. Neck supple. No tracheal deviation present. Cardiovascular: Normal rate, regular rhythm, normal heart sounds and intact distal pulses. Exam reveals no gallop and no friction rub. No murmur heard.   Pulmonary/Chest: Effort normal and breath sounds normal. No respiratory distress. She has no wheezes. She has no rales. She exhibits no tenderness. Abdominal: Soft. Bowel sounds are normal. She exhibits no distension. There is no tenderness. There is no guarding. Musculoskeletal: Normal range of motion. She exhibits no edema or tenderness. Lymphadenopathy:     She has no cervical adenopathy. Neurological: She is alert and oriented to person, place, and time. Skin: Skin is warm and dry. No rash noted. She is not diaphoretic. No erythema. No pallor. Psychiatric: She has a normal mood and affect. Her behavior is normal. Thought content normal.     DIAGNOSTICS      EKG: none    Labs:  CBC:   Recent Labs      09/19/18 0350 09/20/18 0440   WBC  9.8  8.9   HGB  10.5*  10.3*   PLT  326  302     BMP:    Recent Labs      09/19/18 0350 09/20/18 0440   NA  142  142   K  3.9  3.8   CL  104  107   CO2  25  24   BUN  16  10   CREATININE  0.59  0.63   GLUCOSE  115*  109*     S. Calcium:  Recent Labs      09/20/18 0440   CALCIUM  8.4*     S. Ionized Calcium:No results for input(s): IONCA in the last 72 hours. S. Magnesium:No results for input(s): MG in the last 72 hours. S. Phosphorus:No results for input(s): PHOS in the last 72 hours. S. Glucose:No results for input(s): POCGLU in the last 72 hours. Glycosylated hemoglobin A1C: No results found for: LABA1C  Hepatic:   Recent Labs      09/19/18 0350 09/20/18 0440   AST  25  21   ALT  40*  34*     CARDIAC ENZY:   Recent Labs      09/19/18 0350   CKTOTAL  43     INR:   Recent Labs      09/20/18 0440   INR  1.1     BNP: No results for input(s): BNP in the last 72 hours. Invalid input(s):  PROBNP  ABGs: No results for input(s): PH, PCO2, PO2, HCO3, O2SAT in the last 72 hours. Lipids: No results for input(s): CHOL, TRIG, HDL, LDLCALC in the last 72 hours. Invalid input(s): LDL  Pancreatic functions:  Recent Labs      09/19/18 0350   LIPASE  56     S.  Lactic Acid: No results for input(s): LACTA in the last 72 hours.  Thyroid functions:   Lab Results   Component Value Date    TSH 0.76 09/13/2018      U/A:No results for input(s): NITRITE, COLORU, WBCUA, RBCUA, MUCUS, BACTERIA, CLARITYU, SPECGRAV, LEUKOCYTESUR, BLOODU, GLUCOSEU, AMORPHOUS in the last 72 hours. Invalid input(s): Shreya Pedro    Imaging/Diagonstics:     Ct Head Wo Contrast    Result Date: 9/19/2018  EXAMINATION: CT OF THE HEAD WITHOUT CONTRAST  9/19/2018 4:21 am TECHNIQUE: CT of the head was performed without the administration of intravenous contrast. Dose modulation, iterative reconstruction, and/or weight based adjustment of the mA/kV was utilized to reduce the radiation dose to as low as reasonably achievable. COMPARISON: September 17, 2014 HISTORY: ORDERING SYSTEM PROVIDED HISTORY: seizure TECHNOLOGIST PROVIDED HISTORY: Ordering Physician Provided Reason for Exam: seizure Acuity: Acute Type of Exam: Initial FINDINGS: BRAIN/VENTRICLES: There is no acute intracranial hemorrhage, mass effect or midline shift. No abnormal extra-axial fluid collection. The gray-white differentiation is maintained without evidence of an acute infarct. There is no evidence of hydrocephalus. Right basal ganglia dilated perivascular space. ORBITS: The visualized portion of the orbits demonstrate no acute abnormality. SINUSES: Trace paranasal sinus mucosal thickening. SOFT TISSUES/SKULL:  No acute abnormality of the visualized skull or soft tissues. No acute intracranial abnormality. ASSESSMENT  and  PLAN     Principal Problem:    Seizure ([address]  Active Problems:    Polysubstance abuse (Alcohol, Cocaine, heroin, methamphetamine, benzo, THC)    Tobacco use    Psychogenic nonepileptic seizure    Bipolar 1 disorder (HCC)    Alcohol dependence, continuous ([address]    Opiate abuse, continuous ([address]    Benzodiazepine abuse ([address]  Resolved Problems:    * No resolved hospital problems.  *    Plan:    Seizure/psychogenic nonepileptic seizure  -Admitted to [facility]. V's for similar episode

## 2018-09-20 NOTE — PROGRESS NOTES
Pt removing her clothes and insisting she is leaving AMA. Pt  Now has suicide precautions due to making a statement to the night shift RN that she was having suicidal thoughts. Security called to room.

## 2018-09-20 NOTE — PROGRESS NOTES
RN informed by patient's mother that the patient has been having suicidal ideations, patient unable to confirm at this time due to recent administration of ativan. House supervisor notified and guard at bedside to maintain safety.

## 2018-09-20 NOTE — PROGRESS NOTES
LORazepam (ATIVAN) injection 1 mg  1 mg Intravenous Q4H PRN Tish Ruiz MD   1 mg at 09/20/18 0557       LABS & TESTS:      Lab Results   Component Value Date    WBC 8.9 09/20/2018    HGB 10.3 (L) 09/20/2018    HCT 32.2 (L) 09/20/2018    MCV 77.3 (L) 09/20/2018     09/20/2018     O:   VITALS  BP (!) 94/59   Pulse 71   Temp 98.9 °F (37.2 °C) (Oral)   Resp 28   Ht 5' 2\" (1.575 m)   Wt 121 lb 14.6 oz (55.3 kg)   LMP 08/01/2018 (Approximate)   SpO2 100%   BMI 22.30 kg/m²       PHYSICAL EXAMINATIONS:     General appearance: cooperative  Skin: no rash or skin lesions. HEENT: normocephalic, left cheek swelling, pain to touch. No erythema, no tongue lesion  Optic Fundi: deferred  Neck: supple, no cervcical adenopathy or carotid bruit  Lungs: clear to auscultation  Heart: Regular rate and rhythm, normal S1, S2. No murmurs, clicks or gallops. Peripheral pulses: radial pulses palpable  Abdominal: BS present, soft, NT, ND  Extremities: no edema    NEUROLOGICAL EXAMINATION:     MS: awake, alert and oriented. No aphasia, dysarthria, or neglect  CNs: PERRLA, EOMI, VF full, sensation intact, face symmetric, hearing intact, soft palate rises on phonation, sternocleidomastoid and trapezius intact. Tongue midline, no fasciculations. Motor: no abnormal movements, tone and bulk okay. RUE: delta 5/5, biceps 5/5, triceps 5/5,  5/5  LUE: delta 5/5, biceps 5/5, triceps 5/5,  5/5  RLE: hf 5/5, ke 5/5, df 5/5, pf 5/5  LLE: hf 5/5, ke 5/5, df 5/5, pf 5/5  Reflexes: 2+ throughout, symmetric, babinski not present. Coordination: FNF no dysmetria, heel to shin okay, YVAN okay, Rhomberg deferred  Gait: deferred  Sensory: Normal to light touch/temp/pp/vibration, intact joint position sense, no extinction.     ASSRSSMENT/PLANS:      // Seizures  - likely mixed provoked seizures and psychogenic attacks given significant substance use and psychiatric issue, good to be transfer to psych, waiting for bed  - continue CWAS, withdrawal watch and precaution  - continue lamictal as suggested yesterday with titration  - seizure precaution    // Bipolar?  Depression, anxiety, substance use  - UDS positive for benzo, opiates, cannabinoid and cocaine  - pending psychiatry transfer  - follow with psychiatry and psychology    Thanks for this consult, neurology is signing off, please call with questions    Ioana John MD, [address] [place] [address], Neurology  Tel: [phone]

## 2018-09-21 LAB
ALBUMIN SERPL-MCNC: 3.3 G/DL (ref 3.5–5.2)
ALBUMIN/GLOBULIN RATIO: ABNORMAL (ref 1–2.5)
ALP BLD-CCNC: 101 U/L (ref 35–104)
ALT SERPL-CCNC: 32 U/L (ref 5–33)
ANION GAP SERPL CALCULATED.3IONS-SCNC: 14 MMOL/L (ref 9–17)
AST SERPL-CCNC: 25 U/L
BILIRUB SERPL-MCNC: <0.15 MG/DL (ref 0.3–1.2)
BILIRUBIN URINE: NEGATIVE
BUN BLDV-MCNC: 10 MG/DL (ref 6–20)
BUN/CREAT BLD: ABNORMAL (ref 9–20)
CALCIUM SERPL-MCNC: 8.5 MG/DL (ref 8.6–10.4)
CHLORIDE BLD-SCNC: 105 MMOL/L (ref 98–107)
CO2: 19 MMOL/L (ref 20–31)
COLOR: YELLOW
COMMENT UA: NORMAL
CREAT SERPL-MCNC: 0.55 MG/DL (ref 0.5–0.9)
EKG ATRIAL RATE: 83 BPM
EKG P AXIS: 55 DEGREES
EKG P-R INTERVAL: 122 MS
EKG Q-T INTERVAL: 362 MS
EKG QRS DURATION: 78 MS
EKG QTC CALCULATION (BAZETT): 425 MS
EKG R AXIS: 84 DEGREES
EKG T AXIS: 51 DEGREES
EKG VENTRICULAR RATE: 83 BPM
GFR AFRICAN AMERICAN: >60 ML/MIN
GFR NON-AFRICAN AMERICAN: >60 ML/MIN
GFR SERPL CREATININE-BSD FRML MDRD: ABNORMAL ML/MIN/{1.73_M2}
GFR SERPL CREATININE-BSD FRML MDRD: ABNORMAL ML/MIN/{1.73_M2}
GLUCOSE BLD-MCNC: 103 MG/DL (ref 70–99)
GLUCOSE URINE: NEGATIVE
HCT VFR BLD CALC: 31.6 % (ref 36–46)
HEMOGLOBIN: 10.4 G/DL (ref 12–16)
KETONES, URINE: NEGATIVE
LEUKOCYTE ESTERASE, URINE: NEGATIVE
MCH RBC QN AUTO: 24.8 PG (ref 26–34)
MCHC RBC AUTO-ENTMCNC: 32.7 G/DL (ref 31–37)
MCV RBC AUTO: 75.8 FL (ref 80–100)
NITRITE, URINE: NEGATIVE
NRBC AUTOMATED: ABNORMAL PER 100 WBC
PDW BLD-RTO: 17.1 % (ref 11.5–14.9)
PH UA: 7.5 (ref 5–8)
PLATELET # BLD: 315 K/UL (ref 150–450)
PMV BLD AUTO: 7.8 FL (ref 6–12)
POTASSIUM SERPL-SCNC: 4.2 MMOL/L (ref 3.7–5.3)
PROTEIN UA: NEGATIVE
RBC # BLD: 4.17 M/UL (ref 4–5.2)
SODIUM BLD-SCNC: 138 MMOL/L (ref 135–144)
SPECIFIC GRAVITY UA: 1.02 (ref 1–1.03)
TOTAL PROTEIN: 6.8 G/DL (ref 6.4–8.3)
TURBIDITY: CLEAR
URINE HGB: NEGATIVE
UROBILINOGEN, URINE: NORMAL
WBC # BLD: 11 K/UL (ref 3.5–11)

## 2018-09-21 PROCEDURE — 2580000003 HC RX 258: Performed by: NURSE PRACTITIONER

## 2018-09-21 PROCEDURE — 6370000000 HC RX 637 (ALT 250 FOR IP): Performed by: INTERNAL MEDICINE

## 2018-09-21 PROCEDURE — 6360000002 HC RX W HCPCS: Performed by: NURSE PRACTITIONER

## 2018-09-21 PROCEDURE — 93005 ELECTROCARDIOGRAM TRACING: CPT

## 2018-09-21 PROCEDURE — 85027 COMPLETE CBC AUTOMATED: CPT

## 2018-09-21 PROCEDURE — 81003 URINALYSIS AUTO W/O SCOPE: CPT

## 2018-09-21 PROCEDURE — 6370000000 HC RX 637 (ALT 250 FOR IP): Performed by: FAMILY MEDICINE

## 2018-09-21 PROCEDURE — 80053 COMPREHEN METABOLIC PANEL: CPT

## 2018-09-21 PROCEDURE — 1200000000 HC SEMI PRIVATE

## 2018-09-21 PROCEDURE — 36415 COLL VENOUS BLD VENIPUNCTURE: CPT

## 2018-09-21 PROCEDURE — 99239 HOSP IP/OBS DSCHRG MGMT >30: CPT | Performed by: INTERNAL MEDICINE

## 2018-09-21 RX ORDER — BISACODYL 10 MG
10 SUPPOSITORY, RECTAL RECTAL DAILY PRN
Status: CANCELLED | OUTPATIENT
Start: 2018-09-21

## 2018-09-21 RX ORDER — ACETAMINOPHEN 325 MG/1
650 TABLET ORAL EVERY 4 HOURS PRN
Status: CANCELLED | OUTPATIENT
Start: 2018-09-21

## 2018-09-21 RX ORDER — AMOXICILLIN 500 MG/1
500 CAPSULE ORAL EVERY 8 HOURS SCHEDULED
Status: CANCELLED | OUTPATIENT
Start: 2018-09-21

## 2018-09-21 RX ORDER — LAMOTRIGINE 25 MG/1
25 TABLET ORAL DAILY
Status: CANCELLED | OUTPATIENT
Start: 2018-09-22

## 2018-09-21 RX ADMIN — Medication 10 ML: at 19:59

## 2018-09-21 RX ADMIN — ONDANSETRON 4 MG: 2 INJECTION INTRAMUSCULAR; INTRAVENOUS at 06:21

## 2018-09-21 RX ADMIN — AMOXICILLIN 500 MG: 500 CAPSULE ORAL at 05:51

## 2018-09-21 RX ADMIN — ENOXAPARIN SODIUM 40 MG: 40 INJECTION SUBCUTANEOUS at 09:29

## 2018-09-21 RX ADMIN — AMOXICILLIN 500 MG: 500 CAPSULE ORAL at 17:48

## 2018-09-21 RX ADMIN — AMOXICILLIN 500 MG: 500 CAPSULE ORAL at 22:05

## 2018-09-21 RX ADMIN — Medication 10 ML: at 09:29

## 2018-09-21 RX ADMIN — LAMOTRIGINE 25 MG: 25 TABLET ORAL at 09:29

## 2018-09-21 ASSESSMENT — PAIN SCALES - GENERAL: PAINLEVEL_OUTOF10: 0

## 2018-09-21 NOTE — PLAN OF CARE
Problem: Nutrition  Goal: Optimal nutrition therapy  Outcome: Ongoing  patient is tolerating current diet. Problem: Safety:  Goal: Ability to remain free from injury will improve  Ability to remain free from injury will improve   Outcome: Ongoing  Patient is free from falls and injury. Problem: Pain:  Goal: Pain level will decrease  Pain level will decrease   Outcome: Ongoing  Patient is complaining pain with her teeth. Tylenol PRN given.

## 2018-09-21 NOTE — PROGRESS NOTES
Unit notified by [employer] that the psychiatrist at [facility] has refused to admit her there. The [facility] is reportedly calling arounf to find an inpatient unit that will accept her.

## 2018-09-21 NOTE — FLOWSHEET NOTE
Call received from [facility]. Per henok there is a facility called [facility] interested in taking this patient but are requesting a urinalysis and an EKG prior to final decision. Also requested pink slip be faxed.

## 2018-09-21 NOTE — PROGRESS NOTES
Nutrition Assessment    Type and Reason for Visit: Reassess    Nutrition Recommendations: Continue diet and supplements as ordered. Malnutrition Assessment:  · Malnutrition Status: At risk for malnutrition (related to drug & alcohol abuse)  · Context: Acute illness or injury  · Findings of the 6 clinical characteristics of malnutrition (Minimum of 2 out of 6 clinical characteristics is required to make the diagnosis of moderate or severe Protein Calorie Malnutrition based on AND/ASPEN Guidelines):  1. Energy Intake- , not able to assess    2. Weight Loss-Unable to assess,    3. Fat Loss-Unable to assess,    4. Muscle Loss-Unable to assess,    5. Fluid Accumulation-No significant fluid accumulation,    6.  Strength-Not measured    Nutrition Diagnosis:   · Problem: Inadequate oral intake  · Etiology: related to Insufficient energy/nutrient consumption, Other (Drug & alcohol abuse)     Signs and symptoms:  as evidenced by Intake 0-25%    Nutrition Assessment:  · Subjective Assessment: Patient is a 1:1 and HUC said pt was picking at her breakfast, ate some of Urdu toast. Writer read MD notes which indicated pt complained of a tooth ache, pt observed tearful and anxious.    · Nutrition-Focused Physical Findings: nausea & vomiting  · Wound Type: None  · Current Nutrition Therapies:  · Oral Diet Orders: General   · Oral Diet intake: 1-25%  · Anthropometric Measures:  · Ht: 5' 2\" (157.5 cm)   · Current Body Wt: 121 lb (54.9 kg)  · Ideal Body Wt: 110 lb (49.9 kg), % Ideal Body 110%  · BMI Classification: BMI 18.5 - 24.9 Normal Weight  · Comparative Standards (Estimated Nutrition Needs):  · Estimated Daily Total Kcal: 1925 kcal  · Estimated Daily Protein (g): 66-77 gm    Estimated Intake vs Estimated Needs: Intake Less Than Needs    Nutrition Risk Level: High    Nutrition Interventions:   Continue current diet, Continue current ONS  Continued Inpatient Monitoring    Nutrition Evaluation:   · Evaluation: No progress toward goals   · Goals: Adequate nutrition intake or provision    · Monitoring: Meal Intake, Supplement Intake, Diet Tolerance, Nausea or Vomiting, Mental Status/Confusion, Weight, Pertinent Labs    See Adult Nutrition Doc Flowsheet for more detail. Brina Limon R.D. LWENDI.   Clinical Dietitian  Pager: [phone]

## 2018-09-21 NOTE — FLOWSHEET NOTE
Pt transferred to [facility] rm 2041. Guard at bedside.  Nurse given personal belongings and notified of arrival. Electronically signed by Suyapa Wolf RN on 9/21/2018 at 6:50 PM

## 2018-09-21 NOTE — PROGRESS NOTES
Called into room by guard for \"seizure\" like activity lasting 3 minutes. Began to administer normal saline flush and patient stopped convulsing. Patient then asked RN if she had been given ativan.

## 2018-09-21 NOTE — CARE COORDINATION
DISCHARGE PLANNING NOTE:    I called [place] [phone] and initiated the transfer of this patient to [address] for suicidal ideation - She is pink slipped and Dr. Randa Blanco is referring physician. I then called [address] and spoke with Vipin Chiu and informed him of this patients pending admission to their unit and asked what he needed from us. He asked for facesheet, pink slip, most recent physician notes, most recent labs, most recent meds given. Awaiting call back from [place] [phone] with a bed for this patient at [address] [facility] will transport the patient once we have a bed - Will need to call and set-up transport. Will continue to follow.      Electronically signed by Geovanny Navarrete RN on 9/21/2018 at 2:27 PM  [phone]

## 2018-09-21 NOTE — FLOWSHEET NOTE
09/20/18 2220   Provider Notification   Reason for Communication Review case   Provider Name Psych consult   Provider Notification Physician   Method of Communication Secure Message   Response No new orders   Notification Time [phone]   Notified provider on call for tele-psych consult of patient's request for suboxone.

## 2018-09-21 NOTE — DISCHARGE SUMMARY
[facility] SERVICE       Discharge Summary     Patient ID: Karen Whiteside  :  1990   MRN: [id number]     ACCOUNT:  [de-identified]   Patient's PCP: No primary care provider on file. Admit Date: 2018   Discharge Date: 2018     Length of Stay: 2  Code Status:  Full Code  Admitting Physician: Tone Bloom MD  Discharge Physician: Bianca Baker MD     Active Discharge Diagnoses:     Hospital Problem Lists:  Principal Problem:    Seizure [facility])  Active Problems:    Polysubstance abuse (Alcohol, Cocaine, heroin, methamphetamine, benzo, THC)    Tobacco use    Psychogenic nonepileptic seizure    Bipolar 1 disorder ([facility] [address]    Alcohol dependence, continuous ([facility] [address]    Opiate abuse, continuous ([facility] [address]    Benzodiazepine abuse ([facility] [address]  Resolved Problems:    * No resolved hospital problems.  *      Admission Condition:  good     Discharged Condition: good    Hospital Stay:     Hospital Course:  Karen Whiteside is a 28 y.o. female who was admitted for the management of   Seizure [facility]) , presented to ER with Seizures     heroine and etoh withdrawal seizures   lamictal added    Ativan prn   ms normal    Medically stable to tansfer to bhi         Significant therapeutic interventions:     Significant Diagnostic Studies:   Labs / Micro:  CBC:   Lab Results   Component Value Date    WBC 11.0 2018    RBC 4.17 2018    HGB 10.4 2018    HCT 31.6 2018    MCV 75.8 2018    MCH 24.8 2018    MCHC 32.7 2018    RDW 17.1 2018     2018     BMP:    Lab Results   Component Value Date    GLUCOSE 103 2018     2018    K 4.2 2018     2018    CO2 19 2018    ANIONGAP 14 2018    BUN 10 2018    CREATININE 0.55 2018    BUNCRER NOT REPORTED 2018    CALCIUM 8.5 2018    LABGLOM >60 2018    GFRAA >60 2018    GFR      2018    GFR NOT REPORTED 2018 Radiology:    Ct Head Wo Contrast    Result Date: 9/19/2018  EXAMINATION: CT OF THE HEAD WITHOUT CONTRAST  9/19/2018 4:21 am TECHNIQUE: CT of the head was performed without the administration of intravenous contrast. Dose modulation, iterative reconstruction, and/or weight based adjustment of the mA/kV was utilized to reduce the radiation dose to as low as reasonably achievable. COMPARISON: September 17, 2014 HISTORY: ORDERING SYSTEM PROVIDED HISTORY: seizure TECHNOLOGIST PROVIDED HISTORY: Ordering Physician Provided Reason for Exam: seizure Acuity: Acute Type of Exam: Initial FINDINGS: BRAIN/VENTRICLES: There is no acute intracranial hemorrhage, mass effect or midline shift. No abnormal extra-axial fluid collection. The gray-white differentiation is maintained without evidence of an acute infarct. There is no evidence of hydrocephalus. Right basal ganglia dilated perivascular space. ORBITS: The visualized portion of the orbits demonstrate no acute abnormality. SINUSES: Trace paranasal sinus mucosal thickening. SOFT TISSUES/SKULL:  No acute abnormality of the visualized skull or soft tissues. No acute intracranial abnormality. Consultations:    Consults:     Final Specialist Recommendations/Findings:   IP CONSULT TO NEUROLOGY  IP CONSULT TO SOCIAL WORK  IP CONSULT TO PSYCHIATRY  IP CONSULT TO PSYCHIATRY      The patient was seen and examined on day of discharge and this discharge summary is in conjunction with any daily progress note from day of discharge. Discharge plan:     Disposition: bhi    Physician Follow Up:     No follow-up provider specified. Requiring Further Evaluation/Follow Up POST HOSPITALIZATION/Incidental Findings:     Diet: regular diet    Activity: As tolerated    Instructions to Patient:     Discharge Medications:      Medication List      You have not been prescribed any medications.          Time Spent on discharge is 30 to 74 minutes in patient examination, evaluation,

## 2018-09-21 NOTE — FLOWSHEET NOTE
C3 spoke with Dr. Randa Blanco and he will be in within the next hour or so to evaluate Pt and d/c to [facility].  Awaiting arrival. Electronically signed by Jacque Lawrence RN on 9/21/2018 at 10:58 AM

## 2018-09-22 LAB
ALBUMIN SERPL-MCNC: 3.6 G/DL (ref 3.5–5.2)
ALBUMIN/GLOBULIN RATIO: ABNORMAL (ref 1–2.5)
ALP BLD-CCNC: 103 U/L (ref 35–104)
ALT SERPL-CCNC: 34 U/L (ref 5–33)
ANION GAP SERPL CALCULATED.3IONS-SCNC: 16 MMOL/L (ref 9–17)
AST SERPL-CCNC: 25 U/L
BILIRUB SERPL-MCNC: 0.21 MG/DL (ref 0.3–1.2)
BUN BLDV-MCNC: 12 MG/DL (ref 6–20)
BUN/CREAT BLD: ABNORMAL (ref 9–20)
CALCIUM SERPL-MCNC: 9 MG/DL (ref 8.6–10.4)
CHLORIDE BLD-SCNC: 101 MMOL/L (ref 98–107)
CO2: 18 MMOL/L (ref 20–31)
CREAT SERPL-MCNC: 0.6 MG/DL (ref 0.5–0.9)
GFR AFRICAN AMERICAN: >60 ML/MIN
GFR NON-AFRICAN AMERICAN: >60 ML/MIN
GFR SERPL CREATININE-BSD FRML MDRD: ABNORMAL ML/MIN/{1.73_M2}
GFR SERPL CREATININE-BSD FRML MDRD: ABNORMAL ML/MIN/{1.73_M2}
GLUCOSE BLD-MCNC: 149 MG/DL (ref 70–99)
HCT VFR BLD CALC: 37.6 % (ref 36–46)
HEMOGLOBIN: 12.3 G/DL (ref 12–16)
MCH RBC QN AUTO: 24.6 PG (ref 26–34)
MCHC RBC AUTO-ENTMCNC: 32.7 G/DL (ref 31–37)
MCV RBC AUTO: 75.3 FL (ref 80–100)
NRBC AUTOMATED: ABNORMAL PER 100 WBC
PDW BLD-RTO: 17.4 % (ref 11.5–14.9)
PLATELET # BLD: 391 K/UL (ref 150–450)
PMV BLD AUTO: 7.7 FL (ref 6–12)
POTASSIUM SERPL-SCNC: 3.9 MMOL/L (ref 3.7–5.3)
RBC # BLD: 4.99 M/UL (ref 4–5.2)
SODIUM BLD-SCNC: 135 MMOL/L (ref 135–144)
TOTAL PROTEIN: 7.6 G/DL (ref 6.4–8.3)
WBC # BLD: 10.9 K/UL (ref 3.5–11)

## 2018-09-22 PROCEDURE — 6370000000 HC RX 637 (ALT 250 FOR IP): Performed by: INTERNAL MEDICINE

## 2018-09-22 PROCEDURE — 6370000000 HC RX 637 (ALT 250 FOR IP): Performed by: FAMILY MEDICINE

## 2018-09-22 PROCEDURE — 6360000002 HC RX W HCPCS: Performed by: INTERNAL MEDICINE

## 2018-09-22 PROCEDURE — 6360000002 HC RX W HCPCS: Performed by: NURSE PRACTITIONER

## 2018-09-22 PROCEDURE — 99231 SBSQ HOSP IP/OBS SF/LOW 25: CPT | Performed by: INTERNAL MEDICINE

## 2018-09-22 PROCEDURE — 36415 COLL VENOUS BLD VENIPUNCTURE: CPT

## 2018-09-22 PROCEDURE — 80053 COMPREHEN METABOLIC PANEL: CPT

## 2018-09-22 PROCEDURE — 2580000003 HC RX 258: Performed by: NURSE PRACTITIONER

## 2018-09-22 PROCEDURE — 85027 COMPLETE CBC AUTOMATED: CPT

## 2018-09-22 PROCEDURE — 1200000000 HC SEMI PRIVATE

## 2018-09-22 RX ORDER — HYDROXYZINE HYDROCHLORIDE 25 MG/1
25 TABLET, FILM COATED ORAL EVERY 6 HOURS PRN
Status: DISCONTINUED | OUTPATIENT
Start: 2018-09-22 | End: 2018-09-23 | Stop reason: HOSPADM

## 2018-09-22 RX ORDER — METOCLOPRAMIDE HYDROCHLORIDE 5 MG/ML
10 INJECTION INTRAMUSCULAR; INTRAVENOUS 3 TIMES DAILY
Status: DISCONTINUED | OUTPATIENT
Start: 2018-09-22 | End: 2018-09-23 | Stop reason: HOSPADM

## 2018-09-22 RX ADMIN — HYDROXYZINE HYDROCHLORIDE 25 MG: 25 TABLET, FILM COATED ORAL at 19:06

## 2018-09-22 RX ADMIN — ONDANSETRON 4 MG: 2 INJECTION INTRAMUSCULAR; INTRAVENOUS at 20:19

## 2018-09-22 RX ADMIN — Medication 10 ML: at 20:19

## 2018-09-22 RX ADMIN — AMOXICILLIN 500 MG: 500 CAPSULE ORAL at 15:18

## 2018-09-22 RX ADMIN — HYDROXYZINE HYDROCHLORIDE 25 MG: 25 TABLET, FILM COATED ORAL at 12:29

## 2018-09-22 RX ADMIN — ONDANSETRON 4 MG: 2 INJECTION INTRAMUSCULAR; INTRAVENOUS at 15:17

## 2018-09-22 RX ADMIN — LAMOTRIGINE 25 MG: 25 TABLET ORAL at 11:03

## 2018-09-22 RX ADMIN — METOCLOPRAMIDE 10 MG: 5 INJECTION, SOLUTION INTRAMUSCULAR; INTRAVENOUS at 22:56

## 2018-09-22 RX ADMIN — Medication 1 MG: at 00:29

## 2018-09-22 RX ADMIN — AMOXICILLIN 500 MG: 500 CAPSULE ORAL at 06:30

## 2018-09-22 RX ADMIN — AMOXICILLIN 500 MG: 500 CAPSULE ORAL at 20:18

## 2018-09-22 RX ADMIN — Medication 1 MG: at 21:30

## 2018-09-22 NOTE — PROGRESS NOTES
Pt eloped from room. Guard states to this writer the guard from the previous shift were changing out and the patient must have walked out. Code brown initiated. Pt found outside of hospital by RN. Brought back to room, changed back into blue psych gown and educated on 1:1 policy. Pt voiced understanding and will not leave room again. Dr. Allyson Mcdonough notified.

## 2018-09-22 NOTE — CARE COORDINATION
Social work: access center has sent pt packet to [place] and [place] as well as [place] and [place] which are farther away. Those are the main ones who access the [place]. If all facilities that accept the insurance say no, on Monday a one time contract can be requested from the insurance to take pt at [facility] per access. They will continue to work on this throughout the weekend.   Jenise rodrigues

## 2018-09-23 VITALS
WEIGHT: 130.73 LBS | TEMPERATURE: 98.5 F | OXYGEN SATURATION: 100 % | RESPIRATION RATE: 16 BRPM | BODY MASS INDEX: 24.06 KG/M2 | HEIGHT: 62 IN | HEART RATE: 104 BPM | DIASTOLIC BLOOD PRESSURE: 85 MMHG | SYSTOLIC BLOOD PRESSURE: 124 MMHG

## 2018-09-23 LAB
ALBUMIN SERPL-MCNC: 4.1 G/DL (ref 3.5–5.2)
ALBUMIN/GLOBULIN RATIO: ABNORMAL (ref 1–2.5)
ALP BLD-CCNC: 114 U/L (ref 35–104)
ALT SERPL-CCNC: 38 U/L (ref 5–33)
ANION GAP SERPL CALCULATED.3IONS-SCNC: 16 MMOL/L (ref 9–17)
AST SERPL-CCNC: 25 U/L
BILIRUB SERPL-MCNC: 0.31 MG/DL (ref 0.3–1.2)
BUN BLDV-MCNC: 15 MG/DL (ref 6–20)
BUN/CREAT BLD: ABNORMAL (ref 9–20)
CALCIUM SERPL-MCNC: 9.5 MG/DL (ref 8.6–10.4)
CHLORIDE BLD-SCNC: 101 MMOL/L (ref 98–107)
CO2: 21 MMOL/L (ref 20–31)
CREAT SERPL-MCNC: 0.64 MG/DL (ref 0.5–0.9)
GFR AFRICAN AMERICAN: >60 ML/MIN
GFR NON-AFRICAN AMERICAN: >60 ML/MIN
GFR SERPL CREATININE-BSD FRML MDRD: ABNORMAL ML/MIN/{1.73_M2}
GFR SERPL CREATININE-BSD FRML MDRD: ABNORMAL ML/MIN/{1.73_M2}
GLUCOSE BLD-MCNC: 129 MG/DL (ref 70–99)
HCT VFR BLD CALC: 42.3 % (ref 36–46)
HEMOGLOBIN: 13.8 G/DL (ref 12–16)
MCH RBC QN AUTO: 24.5 PG (ref 26–34)
MCHC RBC AUTO-ENTMCNC: 32.7 G/DL (ref 31–37)
MCV RBC AUTO: 74.9 FL (ref 80–100)
NRBC AUTOMATED: ABNORMAL PER 100 WBC
PDW BLD-RTO: 17.1 % (ref 11.5–14.9)
PLATELET # BLD: 527 K/UL (ref 150–450)
PMV BLD AUTO: 7.4 FL (ref 6–12)
POTASSIUM SERPL-SCNC: 3.9 MMOL/L (ref 3.7–5.3)
RBC # BLD: 5.65 M/UL (ref 4–5.2)
SODIUM BLD-SCNC: 138 MMOL/L (ref 135–144)
TOTAL PROTEIN: 8.3 G/DL (ref 6.4–8.3)
WBC # BLD: 12.8 K/UL (ref 3.5–11)

## 2018-09-23 PROCEDURE — 85027 COMPLETE CBC AUTOMATED: CPT

## 2018-09-23 PROCEDURE — 6370000000 HC RX 637 (ALT 250 FOR IP): Performed by: INTERNAL MEDICINE

## 2018-09-23 PROCEDURE — 6360000002 HC RX W HCPCS: Performed by: NURSE PRACTITIONER

## 2018-09-23 PROCEDURE — 36415 COLL VENOUS BLD VENIPUNCTURE: CPT

## 2018-09-23 PROCEDURE — 99231 SBSQ HOSP IP/OBS SF/LOW 25: CPT | Performed by: INTERNAL MEDICINE

## 2018-09-23 PROCEDURE — 2580000003 HC RX 258: Performed by: NURSE PRACTITIONER

## 2018-09-23 PROCEDURE — 6370000000 HC RX 637 (ALT 250 FOR IP): Performed by: FAMILY MEDICINE

## 2018-09-23 PROCEDURE — 80053 COMPREHEN METABOLIC PANEL: CPT

## 2018-09-23 PROCEDURE — 6360000002 HC RX W HCPCS: Performed by: INTERNAL MEDICINE

## 2018-09-23 RX ADMIN — ENOXAPARIN SODIUM 40 MG: 40 INJECTION SUBCUTANEOUS at 09:14

## 2018-09-23 RX ADMIN — HYDROXYZINE HYDROCHLORIDE 25 MG: 25 TABLET, FILM COATED ORAL at 09:13

## 2018-09-23 RX ADMIN — LAMOTRIGINE 25 MG: 25 TABLET ORAL at 09:13

## 2018-09-23 RX ADMIN — AMOXICILLIN 500 MG: 500 CAPSULE ORAL at 05:39

## 2018-09-23 RX ADMIN — METOCLOPRAMIDE 10 MG: 5 INJECTION, SOLUTION INTRAMUSCULAR; INTRAVENOUS at 09:13

## 2018-09-23 RX ADMIN — ONDANSETRON 4 MG: 2 INJECTION INTRAMUSCULAR; INTRAVENOUS at 02:16

## 2018-09-23 RX ADMIN — Medication 10 ML: at 09:14

## 2018-09-23 RX ADMIN — HYDROXYZINE HYDROCHLORIDE 25 MG: 25 TABLET, FILM COATED ORAL at 02:16

## 2018-09-23 NOTE — CARE COORDINATION
ONGOING DISCHARGE PLAN:    [employer] still working on finding facility that can accept patient. Pt states she is no longer suicidal.  Hoping to have a new tele-health eval done today. Will continue to follow.     Electronically signed by Eleanor Knight RN on 9/23/2018 at 10:00 AM

## 2018-09-23 NOTE — PROGRESS NOTES
[place] [address] Internal Medicine    Progress Note    9/23/2018    4:19 PM    Name:   Mickey Virgen  MRN:     [id number]     Acct:      [de-identified]   Room:   [facility] [place] [address] Day:  4  Admit Date:  9/19/2018  3:23 AM    PCP:   No primary care provider on file. Code Status:  Full Code    Subjective:     C/C:   Chief Complaint   Patient presents with    Seizures         HPI:     Seizures overnight   No fever or chills 1 episode of vomiting    Review of Systems:     Constitutional:  negative for chills, fevers, sweats  Respiratory:  negative for cough, dyspnea on exertion, hemoptysis, shortness of breath, wheezing  Cardiovascular:  negative for chest pain, chest pressure/discomfort, lower extremity edema, palpitations  Gastrointestinal:  negative for abdominal pain, constipation, diarrhea, nausea,1 episode of vomiting vomiting  Neurological:  negative for dizziness, headache    Medications: Allergies: Allergies   Allergen Reactions    Haldol [Haloperidol Lactate] Other (See Comments)     Acute Dystonia       Current Meds:   Scheduled Meds:    metoclopramide  10 mg Intravenous TID    lamoTRIgine  25 mg Oral Daily    amoxicillin  500 mg Oral 3 times per day    sodium chloride flush  10 mL Intravenous 2 times per day    enoxaparin  40 mg Subcutaneous Daily     Continuous Infusions:    sodium chloride Stopped (09/21/18 1530)     PRN Meds: hydrOXYzine, melatonin ER, sodium chloride flush, magnesium hydroxide, bisacodyl, ondansetron, nicotine, acetaminophen, LORazepam    Data:     Past Medical History:   has a past medical history of Gall stones; Psychiatric problem; Seizure ([address] and Substance abuse. Social History:   reports that she has been smoking Cigarettes. She has a 1.50 pack-year smoking history. She has never used smokeless tobacco. She reports that she uses drugs, including Marijuana, IV, and Cocaine. She reports that she does not drink alcohol.      Family History:   Family History   Problem Relation Age of Onset    Diabetes Mother         Grandparents    Cancer Other         Breast, great aunt       Vitals:  BP (!) 137/91   Pulse 104   Temp 98.9 °F (37.2 °C) (Oral)   Resp 16   Ht 5' 2\" (1.575 m)   Wt 130 lb 11.7 oz (59.3 kg)   LMP 2018 (Approximate)   SpO2 100%   BMI 23.91 kg/m²   Temp (24hrs), Av.5 °F (36.9 °C), Min:97.2 °F (36.2 °C), Max:99.3 °F (37.4 °C)    No results for input(s): POCGLU in the last 72 hours. I/O (24Hr):   No intake or output data in the 24 hours ending 18 1619    Labs:    Hematology:  Recent Labs      18   0649   WBC  11.0  10.9  12.8*   RBC  4.17  4.99  5.65*   HGB  10.4*  12.3  13.8   HCT  31.6*  37.6  42.3   MCV  75.8*  75.3*  74.9*   MCH  24.8*  24.6*  24.5*   MCHC  32.7  32.7  32.7   RDW  17.1*  17.4*  17.1*   PLT  315  391  527*   MPV  7.8  7.7  7.4     Chemistry:  Recent Labs      18   0649   NA  138  135  138   K  4.2  3.9  3.9   CL  105  101  101   CO2  19*  18*  21   GLUCOSE  103*  149*  129*   BUN  10  12  15   CREATININE  0.55  0.60  0.64   ANIONGAP  14  16  16   LABGLOM  >60  >60  >60   GFRAA  >60  >60  >60   CALCIUM  8.5*  9.0  9.5     Recent Labs      18   0649   PROT  6.8  7.6  8.3   LABALBU  3.3*  3.6  4.1   AST  25  25  25   ALT  32  34*  38*   ALKPHOS  101  103  114*   BILITOT  <0.15*  0.21*  0.31         Lab Results   Component Value Date/Time    SPECIAL LT AC 3ML 2018 05:30 AM    SPECIAL NOT REPORTED 2018 05:30 AM     Lab Results   Component Value Date/Time    CULTURE NO GROWTH 6 DAYS 2018 05:30 AM    CULTURE NO GROWTH 6 DAYS 2018 05:30 AM       Lab Results   Component Value Date    POCPH 7.460 2018    POCPCO2 36.9 2018    POCPO2 78.5 2018    POCHCO3 26.3 2018    NBEA NOT REPORTED 2018    PBEA 2 2018    GQR9LSJ 27

## 2018-11-13 ENCOUNTER — HOSPITAL ENCOUNTER (EMERGENCY)
Age: 28
Discharge: HOME | End: 2018-11-14
Attending: EMERGENCY MEDICINE

## 2018-11-13 DIAGNOSIS — T40.1X1A ACCIDENTAL OVERDOSE OF HEROIN, INITIAL ENCOUNTER (HCC): Primary | ICD-10-CM

## 2018-11-13 PROCEDURE — 99285 EMERGENCY DEPT VISIT HI MDM: CPT

## 2018-11-13 PROCEDURE — 6360000002 HC RX W HCPCS: Performed by: STUDENT IN AN ORGANIZED HEALTH CARE EDUCATION/TRAINING PROGRAM

## 2018-11-13 RX ORDER — 0.9 % SODIUM CHLORIDE 0.9 %
1000 INTRAVENOUS SOLUTION INTRAVENOUS ONCE
Status: DISCONTINUED | OUTPATIENT
Start: 2018-11-13 | End: 2018-11-13

## 2018-11-13 RX ORDER — ONDANSETRON 4 MG/1
4 TABLET, ORALLY DISINTEGRATING ORAL ONCE
Status: COMPLETED | OUTPATIENT
Start: 2018-11-13 | End: 2018-11-13

## 2018-11-13 RX ORDER — ONDANSETRON 2 MG/ML
4 INJECTION INTRAMUSCULAR; INTRAVENOUS ONCE
Status: DISCONTINUED | OUTPATIENT
Start: 2018-11-13 | End: 2018-11-13

## 2018-11-13 RX ADMIN — ONDANSETRON 4 MG: 4 TABLET, ORALLY DISINTEGRATING ORAL at 23:29

## 2018-11-14 VITALS
DIASTOLIC BLOOD PRESSURE: 58 MMHG | SYSTOLIC BLOOD PRESSURE: 101 MMHG | TEMPERATURE: 97.5 F | WEIGHT: 109 LBS | RESPIRATION RATE: 13 BRPM | BODY MASS INDEX: 19.94 KG/M2 | HEART RATE: 87 BPM | OXYGEN SATURATION: 98 %

## 2018-11-14 LAB
EKG ATRIAL RATE: 89 BPM
EKG P AXIS: 46 DEGREES
EKG P-R INTERVAL: 126 MS
EKG Q-T INTERVAL: 336 MS
EKG QRS DURATION: 78 MS
EKG QTC CALCULATION (BAZETT): 408 MS
EKG R AXIS: 68 DEGREES
EKG T AXIS: 34 DEGREES
EKG VENTRICULAR RATE: 89 BPM

## 2018-11-14 PROCEDURE — 93005 ELECTROCARDIOGRAM TRACING: CPT

## 2018-11-14 RX ORDER — ONDANSETRON 4 MG/1
4 TABLET, ORALLY DISINTEGRATING ORAL ONCE
Status: DISCONTINUED | OUTPATIENT
Start: 2018-11-14 | End: 2018-11-14 | Stop reason: HOSPADM

## 2018-11-14 ASSESSMENT — ENCOUNTER SYMPTOMS
NAUSEA: 1
SHORTNESS OF BREATH: 0
SORE THROAT: 0
COUGH: 0
ABDOMINAL PAIN: 0
VOMITING: 1

## 2018-11-14 NOTE — ED NOTES
Pt. Arrives via [facility] for c/o drug overdose. Pt. Was found at friends house outside in underwear and tshirt in the cold. Pt. Admits to snorting heroin earlier today along with xanax. Per [facility] pt has history of drug use and pt. Has been clean for 2 months. Pt. Denies SI or HI. Per [facility] pt. Was breathing slow and shallow, given 4mg IN narcan x4 with positive response.        Lilo Ahn, RN  11/13/18 [address], RN  11/13/18 1001

## 2018-11-14 NOTE — ED PROVIDER NOTES
[facility] ED  Emergency Department Encounter  EmergencyMedicine Resident     Pt Name:Jasmine Dail Gottron  MRN: [id number]  Dameongfepifanio 1990  Date of evaluation: 11/13/18  PCP:  No primary care provider on file. CHIEF COMPLAINT       Chief Complaint   Patient presents with    Drug Overdose     pt. admits to snorting heroin, received multiple rounds of narcan, [facility] unsure of amount       HISTORY OF PRESENT ILLNESS  (Location/Symptom, Timing/Onset, Context/Setting, Quality, Duration, Modifying Factors, Severity.)      Josué Yung is a 33 y.o. female who presents With history of polysubstance abuse presents with apparent oh. Overdose. Patient admits to snorting heroin as well as Xanax, and cocaine. Patient was initially brought to residential the residential would not accept the patient and sent her here for evaluation. Patient denies suicidal ideation or attempt. The patient reported that she was 2 months clean until today. [facility] states that patient received 4 rounds of Narcan for a total of \"16 mg\" (Reportedly) at roughly 9:45 PM.. At time of [employer] arrival a ship had decreased respirations and was diaphoretic. Denies chest pain, shortness of breath. Patient is on complaint now is that she feels cold, temperature normal here. PAST MEDICAL / SURGICAL / SOCIAL / FAMILY HISTORY      has a past medical history of Gall stones; Psychiatric problem; Seizure ([address] and Substance abuse ([facility] [address] has a past surgical history that includes Gallbladder surgery and Cholecystectomy.     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     Social History Main Topics    Smoking status: Current Every Day Smoker     Packs/day: 0.50     Years: 3.00     Types: Cigarettes    Smokeless tobacco: Never Used    Alcohol use Yes      Comment: daily    Drug use: Yes     Types: Marijuana, IV, continuous    EKG 12 Lead       MEDICATIONS ORDERED:  Orders Placed This Encounter   Medications    DISCONTD: 0.9 % sodium chloride bolus    DISCONTD: ondansetron (ZOFRAN) injection 4 mg    ondansetron (ZOFRAN-ODT) disintegrating tablet 4 mg    ondansetron (ZOFRAN-ODT) disintegrating tablet 4 mg       DDX:Accidental heroin overdose, polydrug abuse    DIAGNOSTIC RESULTS / EMERGENCY DEPARTMENT COURSE / MDM     LABS:  Results for orders placed or performed during the hospital encounter of 11/13/18   EKG 12 Lead   Result Value Ref Range    Ventricular Rate 89 BPM    Atrial Rate 89 BPM    P-R Interval 126 ms    QRS Duration 78 ms    Q-T Interval 336 ms    QTc Calculation (Bazett) 408 ms    P Axis 46 degrees    R Axis 68 degrees    T Axis 34 degrees           RADIOLOGY:  None    EKG  None    All EKG's are interpreted by the Emergency Department Physician who either signs or Co-signs this chart in the absence of a cardiologist.    EMERGENCY DEPARTMENT COURSE:  44-year-old female history of IV drug abuse presents after likely heroin overdose. Did receive Narcan by [facility] as she was found to have shallow respirations, was diaphoretic. Patient reports that she also used Xanax, cocaine. No chest pain, no shortness of breath, satting 98% on room air. Initially tachycardic. Patient was observed, EKG was obtained. Heart rate improved after oral rehydration. Patient complaint of nausea without emesis given Zofran for symptom control. 5:33 AM patient heart rate 87, 90% on room air. Pressure stable here. Patient was ambulated in the emergency department without difficulty, stable on feet. No complaint of this time. Patient to be discharged back to detention. PROCEDURES:  None    CONSULTS:  None    CRITICAL CARE:  None    FINAL IMPRESSION      1.  Accidental overdose of heroin, initial encounter [facility])          [address] / [address]  Discharge      PATIENT REFERRED TO:  [facility]  [id number]

## 2020-06-10 ENCOUNTER — OFFICE VISIT (OUTPATIENT)
Dept: FAMILY MEDICINE CLINIC | Age: 30
End: 2020-06-10
Payer: MEDICARE

## 2020-06-10 VITALS
HEIGHT: 62 IN | SYSTOLIC BLOOD PRESSURE: 104 MMHG | BODY MASS INDEX: 25.21 KG/M2 | WEIGHT: 137 LBS | HEART RATE: 79 BPM | DIASTOLIC BLOOD PRESSURE: 70 MMHG | OXYGEN SATURATION: 99 %

## 2020-06-10 PROBLEM — F14.10 COCAINE ABUSE (HCC): Status: RESOLVED | Noted: 2018-09-13 | Resolved: 2020-06-10

## 2020-06-10 PROBLEM — R61 DIAPHORESIS: Status: ACTIVE | Noted: 2020-06-10

## 2020-06-10 PROBLEM — F19.90 IV DRUG USER: Status: RESOLVED | Noted: 2018-09-13 | Resolved: 2020-06-10

## 2020-06-10 PROBLEM — Z78.9 HISTORY OF INCARCERATION: Status: RESOLVED | Noted: 2018-09-13 | Resolved: 2020-06-10

## 2020-06-10 PROBLEM — R56.9 SEIZURES (HCC): Status: RESOLVED | Noted: 2018-09-13 | Resolved: 2020-06-10

## 2020-06-10 PROBLEM — R56.9 SEIZURE (HCC): Status: RESOLVED | Noted: 2018-09-19 | Resolved: 2020-06-10

## 2020-06-10 PROBLEM — F19.10 POLYSUBSTANCE ABUSE (HCC): Status: RESOLVED | Noted: 2018-09-13 | Resolved: 2020-06-10

## 2020-06-10 PROCEDURE — G8419 CALC BMI OUT NRM PARAM NOF/U: HCPCS | Performed by: FAMILY MEDICINE

## 2020-06-10 PROCEDURE — 99204 OFFICE O/P NEW MOD 45 MIN: CPT | Performed by: FAMILY MEDICINE

## 2020-06-10 PROCEDURE — 4004F PT TOBACCO SCREEN RCVD TLK: CPT | Performed by: FAMILY MEDICINE

## 2020-06-10 PROCEDURE — G8427 DOCREV CUR MEDS BY ELIG CLIN: HCPCS | Performed by: FAMILY MEDICINE

## 2020-06-10 SDOH — ECONOMIC STABILITY: FOOD INSECURITY: WITHIN THE PAST 12 MONTHS, THE FOOD YOU BOUGHT JUST DIDN'T LAST AND YOU DIDN'T HAVE MONEY TO GET MORE.: NEVER TRUE

## 2020-06-10 SDOH — ECONOMIC STABILITY: FOOD INSECURITY: WITHIN THE PAST 12 MONTHS, YOU WORRIED THAT YOUR FOOD WOULD RUN OUT BEFORE YOU GOT MONEY TO BUY MORE.: NEVER TRUE

## 2020-06-10 SDOH — ECONOMIC STABILITY: TRANSPORTATION INSECURITY
IN THE PAST 12 MONTHS, HAS THE LACK OF TRANSPORTATION KEPT YOU FROM MEDICAL APPOINTMENTS OR FROM GETTING MEDICATIONS?: NO

## 2020-06-10 SDOH — ECONOMIC STABILITY: TRANSPORTATION INSECURITY
IN THE PAST 12 MONTHS, HAS LACK OF TRANSPORTATION KEPT YOU FROM MEETINGS, WORK, OR FROM GETTING THINGS NEEDED FOR DAILY LIVING?: NO

## 2020-06-10 SDOH — ECONOMIC STABILITY: INCOME INSECURITY: HOW HARD IS IT FOR YOU TO PAY FOR THE VERY BASICS LIKE FOOD, HOUSING, MEDICAL CARE, AND HEATING?: NOT HARD AT ALL

## 2020-06-10 ASSESSMENT — ENCOUNTER SYMPTOMS
COLOR CHANGE: 0
ABDOMINAL DISTENTION: 0
CONSTIPATION: 0
SINUS PRESSURE: 0
CHEST TIGHTNESS: 0
BACK PAIN: 0
PHOTOPHOBIA: 0
VOMITING: 0
COUGH: 0
EYE PAIN: 0
BLOOD IN STOOL: 0
WHEEZING: 0
SINUS PAIN: 0
SHORTNESS OF BREATH: 0

## 2020-06-10 ASSESSMENT — PATIENT HEALTH QUESTIONNAIRE - PHQ9
1. LITTLE INTEREST OR PLEASURE IN DOING THINGS: 0
SUM OF ALL RESPONSES TO PHQ QUESTIONS 1-9: 0
SUM OF ALL RESPONSES TO PHQ QUESTIONS 1-9: 0
SUM OF ALL RESPONSES TO PHQ9 QUESTIONS 1 & 2: 0
2. FEELING DOWN, DEPRESSED OR HOPELESS: 0

## 2020-06-10 NOTE — PROGRESS NOTES
Visit Information    Have you changed or started any medications since your last visit including any over-the-counter medicines, vitamins, or herbal medicines? no   Are you having any side effects from any of your medications? -  no  Have you stopped taking any of your medications? Is so, why? -  no    Have you seen any other physician or provider since your last visit? Yes - Records Obtained  Have you had any other diagnostic tests since your last visit? No  Have you been seen in the emergency room and/or had an admission to a hospital since we last saw you? No  Have you had your routine dental cleaning in the past 6 months? no    Have you activated your [employer] account? If not, what are your barriers?  Yes     Patient Care Team:  Dagoberto Alpers, MD as PCP - General (Family Medicine)    Medical History Review  Past Medical, Family, and Social History reviewed and does contribute to the patient presenting condition    Health Maintenance   Topic Date Due    Varicella vaccine (1 of 2 - 2-dose childhood series) 10/10/1990    Pneumococcal 0-64 years Vaccine (1 of 1 - PPSV23) 10/10/1995    Hepatitis B vaccine (1 of 3 - Risk 3-dose series) 10/10/2008    DTaP/Tdap/Td vaccine (1 - Tdap) 10/10/2008    Cervical cancer screen  10/10/2010    Hepatitis A vaccine  Completed    Flu vaccine  Completed    HIV screen  Completed    Hib vaccine  Aged Out    Meningococcal (ACWY) vaccine  Aged Out

## 2020-06-10 NOTE — PROGRESS NOTES
Chief Complaint   Patient presents with   [place] New Doctor    Manic Behavior    Depression    Hepatitis    Excessive Sweating     hands and feet constantly         Eligio Snide  here today for follow up on chronic medical problems, go over labs and/or diagnostic studies, and medication refills. Established New Doctor; Manic Behavior; Depression; Hepatitis; and Excessive Sweating (hands and feet constantly)      HPI: Patient is here to establish, has history of drug abuse in the past, which has  Resolved. Patient is sober since last 2 years. Patient has history of bipolar disorder and has taken medications in the past currently off of medications symptoms are stable. History of seizure secondary to drug withdrawal, which has resolved. Patient's main concern is excessive sweating and drenching sweats both hands and feet. Patient reports this is going on since last 2 years on and off. The hands get wet with sweat and she has to wipe her hands after every 2 minutes and feet also. She feels more cold than normal, she denies any weight loss any night sweats, denies any lymph node swellings. Patient reports she is healthy does not have any other symptoms. Patient denies any history of rash. Patient has history of hepatitis C, she has  never been evaluated, no viral load checked.  ,        /70   Pulse 79   Ht 5' 2\" (1.575 m)   Wt 137 lb (62.1 kg)   LMP 06/02/2020 (Exact Date)   SpO2 99%   BMI 25.06 kg/m²    Body mass index is 25.06 kg/m². Wt Readings from Last 3 Encounters:   06/10/20 137 lb (62.1 kg)   11/13/18 109 lb (49.4 kg)   09/22/18 130 lb 11.7 oz (59.3 kg)        [x]Negative depression screening.   PHQ Scores 6/10/2020   PHQ2 Score 0   PHQ9 Score 0      []1-4 = Minimal depression   []5-9 = Milddepression   []10-14 = Moderate depression   []15-19 = Moderately severe depression   []20-27 = Severe depression    Discussed testing with the patient and all questions fully answered. Admission on 11/13/2018, Discharged on 11/14/2018   Component Date Value Ref Range Status    Ventricular Rate 11/14/2018 89  BPM Final    Atrial Rate 11/14/2018 89  BPM Final    P-R Interval 11/14/2018 126  ms Final    QRS Duration 11/14/2018 78  ms Final    Q-T Interval 11/14/2018 336  ms Final    QTc Calculation (Bazett) 11/14/2018 408  ms Final    P Axis 11/14/2018 46  degrees Final    R Axis 11/14/2018 68  degrees Final    T Axis 11/14/2018 34  degrees Final         Most recent labs reviewed:     Lab Results   Component Value Date    WBC 12.8 (H) 09/23/2018    HGB 13.8 09/23/2018    HCT 42.3 09/23/2018    MCV 74.9 (L) 09/23/2018     (H) 09/23/2018       @BRIEFLAB(NA,K,CL,CO2,BUN,CREATININE,GLUCOSE,CALCIUM)@     Lab Results   Component Value Date    ALT 38 (H) 09/23/2018    AST 25 09/23/2018    ALKPHOS 114 (H) 09/23/2018    BILITOT 0.31 09/23/2018       Lab Results   Component Value Date    TSH 0.76 09/13/2018       Lab Results   Component Value Date    CHOL 146 05/16/2018     Lab Results   Component Value Date    TRIG 139 05/16/2018     Lab Results   Component Value Date    HDL 41 05/16/2018     Lab Results   Component Value Date    LDLCHOLESTEROL 77 05/16/2018     Lab Results   Component Value Date    VLDL NOT REPORTED 05/16/2018     Lab Results   Component Value Date    CHOLHDLRATIO 3.6 05/16/2018       No results found for: LABA1C    No results found for: GLWEZRPF09    No results found for: FOLATE    No results found for: IRON, TIBC, FERRITIN    No results found for: VITD25          No current outpatient medications on file. No current facility-administered medications for this visit.               Social History     Socioeconomic History    Marital status: Single     Spouse name: Not on file    Number of children: 2    Years of education: Not on file    Highest education level: Not on file   Occupational History     Employer: [employer] MUSCULOSKELETAL: Strength 5/5 bilaterally in all extremities. No tenderness to   palpation of the ribs, long bones, or spine. NEUROLOGIC: Cranial nerves II through XII grossly intact. No focal deficits are noted. ASSESSMENT AND PLAN      1. Excessive sweating  Not sure about the cause, will start from basic blood work, also screen for HIV.  - Hepatitis B Surface Antibody; Future  - CASSANDRA Screen With Reflex; Future  - Sedimentation Rate; Future  - Hepatitis A Antibody, Total; Future  - HIV Screen; Future  - C-Reactive Protein; Future  - XR CHEST STANDARD (2 VW); Future  - Urinalysis With Microscopic; Future  - Metanephrines Plasma Free; Future  - Catecholamines, Fractionated, Plasma; Future    2. Hep C w/o coma, chronic (HCC)  Discussed that he need to see a specialist after doing this blood work. - CBC Auto Differential; Future  - Comprehensive Metabolic Panel; Future  - TSH without Reflex; Future  - HEPATITIS C GENOTYPING; Future  - Hepatitis C RNA, Quantitative, PCR; Future  - AFP Tumor Marker; Future  - XR CHEST STANDARD (2 VW); Future    3. Diaphoresis  Labs ordered    4. Bipolar 1 disorder (HCC)  -Stable continue same medications  - XR CHEST STANDARD (2 VW); Future    5. Preventative health care    - Varicella Zoster Antibody, IgG;  Future      Orders Placed This Encounter   Procedures    XR CHEST STANDARD (2 VW)     Standing Status:   Future     Standing Expiration Date:   6/10/2021     Order Specific Question:   Reason for exam:     Answer:   physical    Varicella Zoster Antibody, IgG     Standing Status:   Future     Standing Expiration Date:   6/10/2021    CBC Auto Differential     Standing Status:   Future     Standing Expiration Date:   6/11/2021    Comprehensive Metabolic Panel     Fasting 8 hrs     Standing Status:   Future     Standing Expiration Date:   6/10/2021    Lipid Panel     Standing Status:   Future     Standing Expiration Date:   6/10/2021     Order Specific Question:   Is Patient Fasting?/# of Hours     Answer:   yes, 8-10 hours    TSH without Reflex     Standing Status:   Future     Standing Expiration Date:   6/10/2021    HEPATITIS C GENOTYPING     Standing Status:   Future     Standing Expiration Date:   6/10/2021    Hepatitis C RNA, Quantitative, PCR     Standing Status:   Future     Standing Expiration Date:   6/10/2021    AFP Tumor Marker     Standing Status:   Future     Standing Expiration Date:   6/10/2021    Hepatitis B Surface Antibody     Standing Status:   Future     Standing Expiration Date:   6/10/2021    CASSANDRA Screen With Reflex     Standing Status:   Future     Standing Expiration Date:   6/10/2021    Sedimentation Rate     Standing Status:   Future     Standing Expiration Date:   6/10/2021    Hepatitis A Antibody, Total     Standing Status:   Future     Standing Expiration Date:   6/10/2021    HIV Screen     Standing Status:   Future     Standing Expiration Date:   6/10/2021    C-Reactive Protein     Standing Status:   Future     Standing Expiration Date:   6/10/2021    Urinalysis With Microscopic     Standing Status:   Future     Standing Expiration Date:   6/10/2021     Order Specific Question:   SPECIFY(EX-CATH,MIDSTREAM,CYSTO,ETC)? Answer:   midstream    Metanephrines Plasma Free     Standing Status:   Future     Standing Expiration Date:   6/10/2021    Catecholamines, Fractionated, Plasma     Standing Status:   Future     Standing Expiration Date:   6/10/2021         There are no discontinued medications. Josef Wolf received counseling on the following healthy behaviors: nutrition, exercise, medication adherence and tobacco cessation  Reviewed prior labs and health maintenance  Continue current medications, diet and exercise. Discussed use, benefit, and side effects of prescribed medications. Barriers to medication compliance addressed.    Patient given educational materials - see patient instructions  Was a self-tracking handout given in paper form or

## 2020-06-19 LAB
ALBUMIN SERPL-MCNC: 4.4 G/DL
ALP BLD-CCNC: 75 U/L
ALT SERPL-CCNC: 46 U/L
ANION GAP SERPL CALCULATED.3IONS-SCNC: 10 MMOL/L
ANTIBODY: <8
AST SERPL-CCNC: 25 U/L
BASOPHILS ABSOLUTE: 0.1 /ΜL
BASOPHILS RELATIVE PERCENT: 1.2 %
BILIRUB SERPL-MCNC: 0.6 MG/DL (ref 0.1–1.4)
BUN BLDV-MCNC: 11 MG/DL
C-REACTIVE PROTEIN: <0.1
CALCIUM SERPL-MCNC: 9.4 MG/DL
CHLORIDE BLD-SCNC: 105 MMOL/L
CHOLESTEROL, TOTAL: 164 MG/DL
CHOLESTEROL/HDL RATIO: 4
CO2: 25 MMOL/L
CREAT SERPL-MCNC: 0.67 MG/DL
EOSINOPHILS ABSOLUTE: 0.3 /ΜL
EOSINOPHILS RELATIVE PERCENT: 4 %
GFR CALCULATED: >60
GLUCOSE BLD-MCNC: 96 MG/DL
HCT VFR BLD CALC: 38.5 % (ref 36–46)
HDLC SERPL-MCNC: 41 MG/DL (ref 35–70)
HEMOGLOBIN: 12.7 G/DL (ref 12–16)
HIV AG/AB: NORMAL
LDL CHOLESTEROL CALCULATED: 107 MG/DL (ref 0–160)
LYMPHOCYTES ABSOLUTE: 2.3 /ΜL
LYMPHOCYTES RELATIVE PERCENT: 28.5 %
MCH RBC QN AUTO: 29.9 PG
MCHC RBC AUTO-ENTMCNC: 33 G/DL
MCV RBC AUTO: 90 FL
MONOCYTES ABSOLUTE: 0.6 /ΜL
MONOCYTES RELATIVE PERCENT: 7.2 %
NEUTROPHILS ABSOLUTE: 4.7 /ΜL
NEUTROPHILS RELATIVE PERCENT: 59.1 %
PDW BLD-RTO: 14.4 %
PLATELET # BLD: 246 K/ΜL
PMV BLD AUTO: 8.5 FL
POTASSIUM SERPL-SCNC: 3.9 MMOL/L
RBC # BLD: 4.26 10^6/ΜL
SEDIMENTATION RATE, ERYTHROCYTE: 10
SODIUM BLD-SCNC: 140 MMOL/L
TOTAL PROTEIN: 7.8
TRIGL SERPL-MCNC: 78 MG/DL
TSH SERPL DL<=0.05 MIU/L-ACNC: 1.95 UIU/ML
VLDLC SERPL CALC-MCNC: 16 MG/DL
WBC # BLD: 7.9 10^3/ML

## 2020-06-25 ENCOUNTER — TELEPHONE (OUTPATIENT)
Dept: GASTROENTEROLOGY | Age: 30
End: 2020-06-25

## 2020-06-25 ENCOUNTER — OFFICE VISIT (OUTPATIENT)
Dept: FAMILY MEDICINE CLINIC | Age: 30
End: 2020-06-25
Payer: MEDICARE

## 2020-06-25 VITALS
WEIGHT: 139 LBS | OXYGEN SATURATION: 97 % | DIASTOLIC BLOOD PRESSURE: 70 MMHG | BODY MASS INDEX: 25.58 KG/M2 | SYSTOLIC BLOOD PRESSURE: 120 MMHG | HEIGHT: 62 IN | HEART RATE: 82 BPM

## 2020-06-25 PROBLEM — R74.01 ELEVATED ALT MEASUREMENT: Status: ACTIVE | Noted: 2020-06-25

## 2020-06-25 PROBLEM — B18.2 HEP C W/O COMA, CHRONIC (HCC): Status: ACTIVE | Noted: 2020-06-25

## 2020-06-25 PROBLEM — F12.20 MARIHUANA DEPENDENCE (HCC): Status: ACTIVE | Noted: 2020-06-25

## 2020-06-25 PROCEDURE — G8427 DOCREV CUR MEDS BY ELIG CLIN: HCPCS | Performed by: FAMILY MEDICINE

## 2020-06-25 PROCEDURE — 4004F PT TOBACCO SCREEN RCVD TLK: CPT | Performed by: FAMILY MEDICINE

## 2020-06-25 PROCEDURE — 90471 IMMUNIZATION ADMIN: CPT | Performed by: FAMILY MEDICINE

## 2020-06-25 PROCEDURE — G8419 CALC BMI OUT NRM PARAM NOF/U: HCPCS | Performed by: FAMILY MEDICINE

## 2020-06-25 PROCEDURE — 90746 HEPB VACCINE 3 DOSE ADULT IM: CPT | Performed by: FAMILY MEDICINE

## 2020-06-25 PROCEDURE — 99214 OFFICE O/P EST MOD 30 MIN: CPT | Performed by: FAMILY MEDICINE

## 2020-06-25 ASSESSMENT — ENCOUNTER SYMPTOMS
DIARRHEA: 0
RHINORRHEA: 0
CONSTIPATION: 0
ROS SKIN COMMENTS: EXCESSIVE SWEATING
WHEEZING: 0
VOMITING: 0
PHOTOPHOBIA: 0
COUGH: 0
ABDOMINAL DISTENTION: 0
BACK PAIN: 0
CHEST TIGHTNESS: 0
COLOR CHANGE: 0
NAUSEA: 0
BLOOD IN STOOL: 0
ABDOMINAL PAIN: 0
SHORTNESS OF BREATH: 0
FACIAL SWELLING: 0

## 2020-06-25 NOTE — PROGRESS NOTES
Visit Information    Have you changed or started any medications since your last visit including any over-the-counter medicines, vitamins, or herbal medicines? no   Are you having any side effects from any of your medications? -  no  Have you stopped taking any of your medications? Is so, why? -  no    Have you seen any other physician or provider since your last visit? No  Have you had any other diagnostic tests since your last visit? Yes - Records Obtained  Have you been seen in the emergency room and/or had an admission to a hospital since we last saw you? No  Have you had your routine dental cleaning in the past 6 months? no    Have you activated your [employer] account? If not, what are your barriers?  Yes     Patient Care Team:  Leslie Quinones MD as PCP - General (Family Medicine)  Leslie Quinones MD as PCP - [facility]    Medical History Review  Past Medical, Family, and Social History reviewed and does contribute to the patient presenting condition    Health Maintenance   Topic Date Due    Pneumococcal 0-64 years Vaccine (1 of 1 - PPSV23) 10/10/1995    Hepatitis B vaccine (1 of 3 - Risk 3-dose series) 10/10/2008    DTaP/Tdap/Td vaccine (1 - Tdap) 10/10/2008    Cervical cancer screen  10/10/2010    Hepatitis A vaccine  Completed    Flu vaccine  Completed    HIV screen  Completed    Hib vaccine  Aged Out    Meningococcal (ACWY) vaccine  Aged Out    Varicella vaccine  Discontinued

## 2020-06-25 NOTE — PROGRESS NOTES
Chief Complaint   Patient presents with    Results     blood work          Tera Givens  here today for follow up on chronic medical problems, go over labs and/or diagnostic studies, and medication refills. Results (blood work )      HPI: Patient is here for follow-up on hepatitis C and high viral load, for tumor markers negative. All blood work is negative except hep C viral load. Patient was evaluated for excessive sweating, she had metanephrines and catecholamine test also done which is within normal range. Patient still has same symptoms, HIV was also negative. Patient needs further evaluation. /70   Pulse 82   Ht 5' 2\" (1.575 m)   Wt 139 lb (63 kg)   LMP 06/02/2020 (Exact Date)   SpO2 97%   BMI 25.42 kg/m²    Body mass index is 25.42 kg/m². Wt Readings from Last 3 Encounters:   06/25/20 139 lb (63 kg)   06/10/20 137 lb (62.1 kg)   11/13/18 109 lb (49.4 kg)        [x]Negative depression screening. PHQ Scores 6/10/2020   PHQ2 Score 0   PHQ9 Score 0      []1-4 = Minimal depression   []5-9 = Milddepression   []10-14 = Moderate depression   []15-19 = Moderately severe depression   []20-27 = Severe depression    Discussed testing with the patient and all questions fully answered.     Orders Only on 06/22/2020   Component Date Value Ref Range Status    Sodium 06/19/2020 140  mmol/L Final    Chloride 06/19/2020 105  mmol/L Final    Potassium 06/19/2020 3.9  mmol/L Final    BUN 06/19/2020 11  mg/dL Final    CREATININE 06/19/2020 0.67   Final    Glucose 06/19/2020 96  mg/dL Final    AST 06/19/2020 25  U/L Final    ALT 06/19/2020 46* U/L Final    Calcium 06/19/2020 9.4  mg/dL Final    Total Protein 06/19/2020 7.8   Final    CO2 06/19/2020 25  mmol/L Final    Alb 06/19/2020 4.4   Final    Alkaline Phosphatase 06/19/2020 75  U/L Final    Total Bilirubin 06/19/2020 0.6  0.1 - 1.4 mg/dL Final    Gfr Calculated 06/19/2020 >60   Final    Anion Gap 06/19/2020 10  mmol/L Final  CRP 06/19/2020 <0.1   Final    Sed Rate 06/19/2020 10   Final    WBC 06/19/2020 7.9  10^3/mL Final    RBC 06/19/2020 4.26  10^6/µL Final    Hemoglobin 06/19/2020 12.7  12.0 - 16.0 g/dL Final    Hematocrit 06/19/2020 38.5  36 - 46 % Final    MCV 06/19/2020 90  fL Final    MCH 06/19/2020 29.9  pg Final    MCHC 06/19/2020 33.0  g/dL Final    Platelets 13/43/8492 246  K/µL Final    RDW 06/19/2020 14.4  % Final    MPV 06/19/2020 8.5  fL Final    Neutrophils % 06/19/2020 59.1  % Final    Lymphocytes % 06/19/2020 28.5  % Final    Monocytes % 06/19/2020 7.2  % Final    Eosinophils % 06/19/2020 4.0  % Final    Basophils % 06/19/2020 1.2  % Final    Neutrophils Absolute 06/19/2020 4.7  /µL Final    Lymphocytes Absolute 06/19/2020 2.3  /µL Final    Monocytes Absolute 06/19/2020 0.6  /µL Final    Eosinophils Absolute 06/19/2020 0.3  /µL Final    Basophils Absolute 06/19/2020 0.1  /µL Final    Antibody Screen 06/19/2020 <8.00   Final    TSH 06/19/2020 1.95  uIU/mL Final    Cholesterol, Total 06/19/2020 164  mg/dL Final    HDL 06/19/2020 41  35 - 70 mg/dL Final    LDL Calculated 06/19/2020 107  0 - 160 mg/dL Final    Triglycerides 06/19/2020 78  mg/dL Final    Chol/HDL Ratio 06/19/2020 4.0   Final    VLDL 06/19/2020 16  mg/dL Final         Most recent labs reviewed:     Lab Results   Component Value Date    WBC 7.9 06/19/2020    HGB 12.7 06/19/2020    HCT 38.5 06/19/2020    MCV 90 06/19/2020     06/19/2020       @BRIEFLAB(NA,K,CL,CO2,BUN,CREATININE,GLUCOSE,CALCIUM)@     Lab Results   Component Value Date    ALT 46 (H) 06/19/2020    AST 25 06/19/2020    ALKPHOS 75 06/19/2020    BILITOT 0.6 06/19/2020       Lab Results   Component Value Date    TSH 1.95 06/19/2020       Lab Results   Component Value Date    CHOL 164 06/19/2020    CHOL 146 05/16/2018     Lab Results   Component Value Date    TRIG 78 06/19/2020    TRIG 139 05/16/2018     Lab Results   Component Value Date    HDL 41 06/19/2020 Referral Priority:   Routine     Referral Type:   Eval and Treat     Referral Reason:   Specialty Services Required     Referred to Provider:   Jan Mendoza MD     Requested Specialty:   Gastroenterology     Number of Visits Requested:   Rocio Nicole MD, Endocrinology, [facility]     Referral Priority:   Routine     Referral Type:   Eval and Treat     Referral Reason:   Specialty Services Required     Referred to Provider:   Cadence Spicer MD     Requested Specialty:   Endocrinology     Number of Visits Requested:   1         There are no discontinued medications. Romeo Dumont received counseling on the following healthy behaviors: nutrition, exercise and medication adherence  Reviewed prior labs and health maintenance  Continue current medications, diet and exercise. Discussed use, benefit, and side effects of prescribed medications. Barriers to medication compliance addressed. Patient given educational materials - see patient instructions  Was a self-tracking handout given in paper form or via [employer]? Yes    Requested Prescriptions      No prescriptions requested or ordered in this encounter       All patient questions answered. Patient voiced understanding. Quality Measures    Body mass index is 25.42 kg/m². Normal. Weight control planned discussed Healthy diet and regular exercise. BP: 120/70 Blood pressure is normal. Treatment plan consists of No treatment change needed.     Lab Results   Component Value Date    LDLCALC 107 06/19/2020    LDLCHOLESTEROL 77 05/16/2018    (goal LDL reduction with dx if diabetes is 50% LDL reduction)      PHQ Scores 6/10/2020   PHQ2 Score 0   PHQ9 Score 0     Interpretation of Total Score Depression Severity: 1-4 = Minimal depression, 5-9 = Mild depression, 10-14 = Moderate depression, 15-19 = Moderately severe depression, 20-27 = Severe depression    The patient'spast medical, surgical, social, and family history as well as her   current medications and allergies

## 2020-07-13 ENCOUNTER — OFFICE VISIT (OUTPATIENT)
Dept: GASTROENTEROLOGY | Age: 30
End: 2020-07-13
Payer: MEDICARE

## 2020-07-13 VITALS — BODY MASS INDEX: 24.69 KG/M2 | TEMPERATURE: 98.1 F | RESPIRATION RATE: 17 BRPM | WEIGHT: 135 LBS

## 2020-07-13 PROCEDURE — 1036F TOBACCO NON-USER: CPT | Performed by: INTERNAL MEDICINE

## 2020-07-13 PROCEDURE — 99202 OFFICE O/P NEW SF 15 MIN: CPT | Performed by: INTERNAL MEDICINE

## 2020-07-13 PROCEDURE — G8427 DOCREV CUR MEDS BY ELIG CLIN: HCPCS | Performed by: INTERNAL MEDICINE

## 2020-07-13 PROCEDURE — G8420 CALC BMI NORM PARAMETERS: HCPCS | Performed by: INTERNAL MEDICINE

## 2020-07-13 ASSESSMENT — ENCOUNTER SYMPTOMS
BACK PAIN: 1
EYES NEGATIVE: 1
ALLERGIC/IMMUNOLOGIC NEGATIVE: 1
ABDOMINAL PAIN: 1
RESPIRATORY NEGATIVE: 1

## 2020-07-13 NOTE — PROGRESS NOTES
file    Highest education level: Not on file   Occupational History     Employer: [id number] strain: Not hard at all   [employer] insecurity     Worry: Never true     Inability: Never true   [employer] needs     Medical: No     Non-medical: No   Tobacco Use    Smoking status: Former Smoker     Packs/day: 0.50     Years: 3.00     Pack years: 1.50     Types: Cigarettes     Start date: 2010     Last attempt to quit: 2018     Years since quittin.0    Smokeless tobacco: Never Used   Substance and Sexual Activity    Alcohol use: Not Currently     Comment: socially    Drug use: Yes     Types: Marijuana, IV, Cocaine     Comment: free of IV drugs for 2 years    Sexual activity: Not Currently     Partners: Male   Lifestyle    Physical activity     Days per week: Not on file     Minutes per session: Not on file    Stress: Not on file   Relationships    Social connections     Talks on phone: Not on file     Gets together: Not on file     Attends Yarsanism service: Not on file     Active member of club or organization: Not on file     Attends meetings of clubs or organizations: Not on file     Relationship status: Not on file    Intimate partner violence     Fear of current or ex partner: Not on file     Emotionally abused: Not on file     Physically abused: Not on file     Forced sexual activity: Not on file   Other Topics Concern    Not on file   Social History Narrative    Not on file         REVIEW OF SYSTEMS: A 12-point review of systems was obtained and pertinent positives and negatives were listed below. REVIEW OF SYSTEMS:     Constitutional: No fever, no chills, no lethargy, no weakness. HEENT:  No headache, otalgia, itchy eyes, nasal discharge or sore throat. Cardiac:  No chest pain, dyspnea, orthopnea or PND. Chest:   No cough, phlegm or wheezing.   Abdomen:      Detailed by MA   Neuro:  No focal weakness, abnormal movements or seizure like activity. Skin:   No rashes, no itching. :   No hematuria, no pyuria, no dysuria, no flank pain. Extremities:  No swelling or joint pains. ROS was otherwise negative    Review of Systems    PHYSICAL EXAMINATION: Vital signs reviewed per the nursing documentation. Temp 98.1 °F (36.7 °C)   Resp 17   Wt 135 lb (61.2 kg)   BMI 24.69 kg/m²   Body mass index is 24.69 kg/m². Physical Exam    Physical Exam   Constitutional: Patient is oriented to person, place, and time. Patient appears well-developed and well-nourished. HENT:   Head: Normocephalic and atraumatic. Eyes: Pupils are equal, round, and reactive to light. EOM are normal.   Neck: Normal range of motion. Neck supple. No JVD present. No tracheal deviation present. No thyromegaly present. Cardiovascular: Normal rate, regular rhythm, normal heart sounds and intact distal pulses. Pulmonary/Chest: Effort normal and breath sounds normal. No stridor. No respiratory distress. He has no wheezes. He has no rales. He exhibits no tenderness. Abdominal: Soft. Bowel sounds are normal. He exhibits no distension and no mass. There is no tenderness. There is no rebound and no guarding. No hernia. Musculoskeletal: Normal range of motion. Lymphadenopathy:    Patient has no cervical adenopathy. Neurological: Patient is alert and oriented to person, place, and time. Psychiatric: Patient has a normal mood and affect.  Patient behavior is normal.       LABORATORY DATA: Reviewed  Lab Results   Component Value Date    WBC 7.9 06/19/2020    HGB 12.7 06/19/2020    HCT 38.5 06/19/2020    MCV 90 06/19/2020     06/19/2020     06/19/2020    K 3.9 06/19/2020     06/19/2020    CO2 25 06/19/2020    BUN 11 06/19/2020    CREATININE 0.67 06/19/2020    LABALBU 4.4 06/19/2020    BILITOT 0.6 06/19/2020    ALKPHOS 75 06/19/2020    AST 25 06/19/2020    ALT 46 (H) 06/19/2020    INR 1.1 09/20/2018         Lab Results   Component Value Date    RBC 4.26 06/19/2020    HGB 12.7 06/19/2020    MCV 90 06/19/2020    MCH 29.9 06/19/2020    MCHC 33.0 06/19/2020    RDW 14.4 06/19/2020    MPV 8.5 06/19/2020    BASOPCT 1.2 06/19/2020    LYMPHSABS 2.3 06/19/2020    MONOSABS 0.6 06/19/2020    NEUTROABS 4.7 06/19/2020    EOSABS 0.3 06/19/2020    BASOSABS 0.1 06/19/2020         DIAGNOSTIC TESTING:     No results found. IMPRESSION: Shu Shankarkeeper is a 26 y.o. female with a past history remarkable for prior history of polysubstance abuse, IVDA, last used 11 months ago, referred for evaluation of chronic hepatitis C      IVDA- last used 2 yrs ago, used for 11 yrs. Heroin. Smoking crack. Active smoker- cannibus,   No tobacco  Social drinker  CCY   FH-  Father with heroin addiction, Mother with heroin addiction. Chronic Hepatitis C- GT 3, first known to patient 3 yrs ago. VL of 52,000. Pending liver US. No evidence of cirrhosis on examination or serological evidence of cirrhosis. PLAN:    1) Chronic HCV- GT 3, will send full serological studies and obtain liver US. RTC after results. 2) RTC in 8 weeks. Thank you for allowing me to participate in the care of Ms. Blanca Dejesus. For any further questions please do not hesitate to contact me. I have reviewed and agree with the MA/LPN ROS please refer to their documentation from today's encounter on a separate note. Elicia Liu MD, MPH   [facility]  Office #: [phone]          this note is created with the assistance of a speech recognition program.  While intending to generate a document that actually reflects the content of the visit, the document can still have some errors including those of syntax and sound a like substitutions which may escape proof reading. It such instances, actual meaning can be extrapolated by contextual diversion.

## 2020-07-13 NOTE — PROGRESS NOTES
Subjective:      Patient ID: Bandar Stokes is a 26 y.o. female. HPI    Review of Systems   Constitutional: Positive for fatigue. HENT: Positive for dental problem. Eyes: Negative. Respiratory: Negative. Cardiovascular: Positive for palpitations. Gastrointestinal: Positive for abdominal pain. Endocrine: Negative. Genitourinary: Negative. Musculoskeletal: Positive for back pain. Allergic/Immunologic: Negative. Neurological: Positive for tremors, weakness and numbness. Hematological: Bruises/bleeds easily. Psychiatric/Behavioral: The patient is nervous/anxious. All other systems reviewed and are negative.       Objective:   Physical Exam    Assessment:            Plan:

## 2020-09-11 ENCOUNTER — TELEPHONE (OUTPATIENT)
Dept: GASTROENTEROLOGY | Age: 30
End: 2020-09-11

## 2020-09-11 NOTE — TELEPHONE ENCOUNTER
patient had confirmed appointment on 9/10/2010 and then was a no show for her appointment this morning .  Also mailing No Show Letter

## 2020-09-25 RX ORDER — CLINDAMYCIN HYDROCHLORIDE 300 MG/1
CAPSULE ORAL
COMMUNITY
Start: 2020-08-17 | End: 2020-09-28

## 2020-09-25 RX ORDER — ACETAMINOPHEN AND CODEINE PHOSPHATE 300; 30 MG/1; MG/1
TABLET ORAL
COMMUNITY
Start: 2020-08-17 | End: 2021-06-30 | Stop reason: ALTCHOICE

## 2020-09-28 ENCOUNTER — OFFICE VISIT (OUTPATIENT)
Dept: FAMILY MEDICINE CLINIC | Age: 30
End: 2020-09-28
Payer: MEDICARE

## 2020-09-28 VITALS
HEIGHT: 62 IN | SYSTOLIC BLOOD PRESSURE: 120 MMHG | TEMPERATURE: 98.2 F | BODY MASS INDEX: 24.44 KG/M2 | HEART RATE: 80 BPM | DIASTOLIC BLOOD PRESSURE: 70 MMHG | WEIGHT: 132.8 LBS | OXYGEN SATURATION: 98 %

## 2020-09-28 PROCEDURE — 90472 IMMUNIZATION ADMIN EACH ADD: CPT | Performed by: FAMILY MEDICINE

## 2020-09-28 PROCEDURE — 90746 HEPB VACCINE 3 DOSE ADULT IM: CPT | Performed by: FAMILY MEDICINE

## 2020-09-28 PROCEDURE — 90732 PPSV23 VACC 2 YRS+ SUBQ/IM: CPT | Performed by: FAMILY MEDICINE

## 2020-09-28 PROCEDURE — 99213 OFFICE O/P EST LOW 20 MIN: CPT | Performed by: FAMILY MEDICINE

## 2020-09-28 PROCEDURE — G8427 DOCREV CUR MEDS BY ELIG CLIN: HCPCS | Performed by: FAMILY MEDICINE

## 2020-09-28 PROCEDURE — 90715 TDAP VACCINE 7 YRS/> IM: CPT | Performed by: FAMILY MEDICINE

## 2020-09-28 PROCEDURE — 90471 IMMUNIZATION ADMIN: CPT | Performed by: FAMILY MEDICINE

## 2020-09-28 PROCEDURE — 1036F TOBACCO NON-USER: CPT | Performed by: FAMILY MEDICINE

## 2020-09-28 PROCEDURE — G8420 CALC BMI NORM PARAMETERS: HCPCS | Performed by: FAMILY MEDICINE

## 2020-09-28 ASSESSMENT — ENCOUNTER SYMPTOMS
PHOTOPHOBIA: 0
SHORTNESS OF BREATH: 0
COUGH: 0
BACK PAIN: 0
CHEST TIGHTNESS: 0
ABDOMINAL DISTENTION: 0
COLOR CHANGE: 0
SINUS PAIN: 0
ABDOMINAL PAIN: 0
NAUSEA: 0
CONSTIPATION: 0
BLOOD IN STOOL: 0
SINUS PRESSURE: 0
WHEEZING: 0
VOMITING: 0

## 2020-09-28 NOTE — PROGRESS NOTES
Visit Information    Have you changed or started any medications since your last visit including any over-the-counter medicines, vitamins, or herbal medicines? no   Are you having any side effects from any of your medications? -  no  Have you stopped taking any of your medications? Is so, why? -  no    Have you seen any other physician or provider since your last visit? Yes - Records Obtained  Have you had any other diagnostic tests since your last visit? Yes - Records Obtained  Have you been seen in the emergency room and/or had an admission to a hospital since we last saw you? No  Have you had your routine dental cleaning in the past 6 months? no    Have you activated your [employer] account? If not, what are your barriers?  Yes     Patient Care Team:  Prema Styles MD as PCP - General (Family Medicine)  Prema Styles MD as PCP - [facility]    Medical History Review  Past Medical, Family, and Social History reviewed and does contribute to the patient presenting condition    Health Maintenance   Topic Date Due    Pneumococcal 0-64 years Vaccine (1 of 1 - PPSV23) 10/10/1995    DTaP/Tdap/Td vaccine (1 - Tdap) 10/10/2008    Cervical cancer screen  10/10/2010    Hepatitis B vaccine (2 of 3 - Risk 3-dose series) 07/23/2020    Flu vaccine (1) 09/01/2020    Hepatitis A vaccine  Completed    HIV screen  Completed    Hib vaccine  Aged Out    Meningococcal (ACWY) vaccine  Aged Out    Varicella vaccine  Discontinued

## 2020-09-28 NOTE — PROGRESS NOTES
Chief Complaint   Patient presents with    Excessive Sweating     still going on          Donte Milton  here today for follow up on chronic medical problems, go over labs and/or diagnostic studies, and medication refills. Excessive Sweating (still going on )      HPI : Patient is here for follow-up on excessive sweating and hepatitis C. Patient was referred to endocrinologist reports he ordered further blood work which she has not done yet. Patient has not scheduled follow-up appointment with him. Hepatitis C, seen by GI planning to start her on treatment. Patient is a cannabis user and she needs to get card before starting treatment. Bipolar disorder patient is not on any medications. Patient refused flu shot. /70   Pulse 80   Temp 98.2 °F (36.8 °C) (Temporal)   Ht 5' 2\" (1.575 m)   Wt 132 lb 12.8 oz (60.2 kg)   LMP  (LMP Unknown)   SpO2 98%   BMI 24.29 kg/m²    Body mass index is 24.29 kg/m². Wt Readings from Last 3 Encounters:   09/28/20 132 lb 12.8 oz (60.2 kg)   07/13/20 135 lb (61.2 kg)   06/25/20 139 lb (63 kg)        [x]Negative depression screening. PHQ Scores 6/10/2020   PHQ2 Score 0   PHQ9 Score 0      []1-4 = Minimal depression   []5-9 = Milddepression   []10-14 = Moderate depression   []15-19 = Moderately severe depression   []20-27 = Severe depression    Discussed testing with the patient and all questions fully answered.     Orders Only on 06/22/2020   Component Date Value Ref Range Status    Sodium 06/19/2020 140  mmol/L Final    Chloride 06/19/2020 105  mmol/L Final    Potassium 06/19/2020 3.9  mmol/L Final    BUN 06/19/2020 11  mg/dL Final    CREATININE 06/19/2020 0.67   Final    Glucose 06/19/2020 96  mg/dL Final    AST 06/19/2020 25  U/L Final    ALT 06/19/2020 46* U/L Final    Calcium 06/19/2020 9.4  mg/dL Final    Total Protein 06/19/2020 7.8   Final    CO2 06/19/2020 25  mmol/L Final    Alb 06/19/2020 4.4   Final    Alkaline Phosphatase 06/19/2020 75  U/L Final    Total Bilirubin 06/19/2020 0.6  0.1 - 1.4 mg/dL Final    Gfr Calculated 06/19/2020 >60   Final    Anion Gap 06/19/2020 10  mmol/L Final    CRP 06/19/2020 <0.1   Final    Sed Rate 06/19/2020 10   Final    WBC 06/19/2020 7.9  10^3/mL Final    RBC 06/19/2020 4.26  10^6/µL Final    Hemoglobin 06/19/2020 12.7  12.0 - 16.0 g/dL Final    Hematocrit 06/19/2020 38.5  36 - 46 % Final    MCV 06/19/2020 90  fL Final    MCH 06/19/2020 29.9  pg Final    MCHC 06/19/2020 33.0  g/dL Final    Platelets 61/45/8631 246  K/µL Final    RDW 06/19/2020 14.4  % Final    MPV 06/19/2020 8.5  fL Final    Neutrophils % 06/19/2020 59.1  % Final    Lymphocytes % 06/19/2020 28.5  % Final    Monocytes % 06/19/2020 7.2  % Final    Eosinophils % 06/19/2020 4.0  % Final    Basophils % 06/19/2020 1.2  % Final    Neutrophils Absolute 06/19/2020 4.7  /µL Final    Lymphocytes Absolute 06/19/2020 2.3  /µL Final    Monocytes Absolute 06/19/2020 0.6  /µL Final    Eosinophils Absolute 06/19/2020 0.3  /µL Final    Basophils Absolute 06/19/2020 0.1  /µL Final    Antibody Screen 06/19/2020 <8.00   Final    TSH 06/19/2020 1.95  uIU/mL Final    Cholesterol, Total 06/19/2020 164  mg/dL Final    HDL 06/19/2020 41  35 - 70 mg/dL Final    LDL Calculated 06/19/2020 107  0 - 160 mg/dL Final    Triglycerides 06/19/2020 78  mg/dL Final    Chol/HDL Ratio 06/19/2020 4.0   Final    VLDL 06/19/2020 16  mg/dL Final         Most recent labs reviewed:     Lab Results   Component Value Date    WBC 7.9 06/19/2020    HGB 12.7 06/19/2020    HCT 38.5 06/19/2020    MCV 90 06/19/2020     06/19/2020       @BRIEFLAB(NA,K,CL,CO2,BUN,CREATININE,GLUCOSE,CALCIUM)@     Lab Results   Component Value Date    ALT 46 (H) 06/19/2020    AST 25 06/19/2020    ALKPHOS 75 06/19/2020    BILITOT 0.6 06/19/2020       Lab Results   Component Value Date    TSH 1.95 06/19/2020       Lab Results   Component Value Date    CHOL 164 06/19/2020 CHOL 146 2018     Lab Results   Component Value Date    TRIG 78 2020    TRIG 139 2018     Lab Results   Component Value Date    HDL 41 2020    HDL 41 2018     Lab Results   Component Value Date    LDLCALC 107 2020    LDLCHOLESTEROL 77 2018     Lab Results   Component Value Date    VLDL 16 2020    VLDL NOT REPORTED 2018     Lab Results   Component Value Date    CHOLHDLRATIO 4.0 2020    CHOLHDLRATIO 3.6 2018       No results found for: LABA1C    No results found for: [id number]    No results found for: FOLATE    No results found for: IRON, TIBC, FERRITIN    No results found for: VITD25          Current Outpatient Medications   Medication Sig Dispense Refill    acetaminophen-codeine (TYLENOL #3) 300-30 MG per tablet take 1 tablet by mouth every 4 to 6 hours if needed for pain       No current facility-administered medications for this visit.               Social History     Socioeconomic History    Marital status: Single     Spouse name: Not on file    Number of children: 2    Years of education: Not on file    Highest education level: Not on file   Occupational History     Employer: [employer]   Social Needs    Financial resource strain: Not hard at all   Seeker[employer] insecurity     Worry: Never true     Inability: Never true   RIGID needs     Medical: No     Non-medical: No   Tobacco Use    Smoking status: Former Smoker     Packs/day: 0.50     Years: 3.00     Pack years: 1.50     Types: Cigarettes     Start date: 2010     Last attempt to quit: 2018     Years since quittin.2    Smokeless tobacco: Never Used   Substance and Sexual Activity    Alcohol use: Not Currently     Comment: socially    Drug use: Yes     Types: Marijuana, IV, Cocaine     Comment: free of IV drugs for 2 years    Sexual activity: Not Currently     Partners: Male   Lifestyle    Physical activity     Days per week: Not on file     Minutes per session: Not on file    Stress: Not on file   Relationships    Social connections     Talks on phone: Not on file     Gets together: Not on file     Attends Scientologist service: Not on file     Active member of club or organization: Not on file     Attends meetings of clubs or organizations: Not on file     Relationship status: Not on file    Intimate partner violence     Fear of current or ex partner: Not on file     Emotionally abused: Not on file     Physically abused: Not on file     Forced sexual activity: Not on file   Other Topics Concern    Not on file   Social History Narrative    Not on file     Counseling given: Yes        Family History   Problem Relation Age of Onset    Diabetes Mother         Grandparents    Cancer Other         Breast, great aunt             -rest of complaints with corresponding details per ROS    The patient's past medical, surgical, social, and family history as well as her current medications and allergies were reviewed as documented intoday's encounter. Review of Systems   Constitutional: Positive for diaphoresis. Negative for activity change, appetite change, chills, fatigue and unexpected weight change. HENT: Negative for congestion, ear discharge, ear pain, nosebleeds, postnasal drip, sinus pressure, sinus pain, sneezing and tinnitus. Eyes: Negative for photophobia and visual disturbance. Respiratory: Negative for cough, chest tightness, shortness of breath and wheezing. Cardiovascular: Negative for chest pain, palpitations and leg swelling. Gastrointestinal: Negative for abdominal distention, abdominal pain, blood in stool, constipation, nausea and vomiting. Endocrine: Negative for polyphagia and polyuria. Genitourinary: Negative for decreased urine volume, difficulty urinating, flank pain, frequency, hematuria, urgency and vaginal pain. Musculoskeletal: Negative for arthralgias, back pain, gait problem, myalgias and neck stiffness.    Skin: counseling and education. Future Appointments   Date Time Provider Pablo Arriaga   12/29/2020 10:15 AM [facility]     This note was completed by using the assistance of a speech-recognition program. However, inadvertent computerized transcription errors may be present. Althoughevery effort was made to ensure accuracy, no guarantees can be provided that every mistake has been identified and corrected by editing.   Electronically signed by Jose Lawrence MD on 9/28/2020  10:22 AM

## 2020-10-28 ENCOUNTER — NURSE ONLY (OUTPATIENT)
Dept: FAMILY MEDICINE CLINIC | Age: 30
End: 2020-10-28
Payer: MEDICARE

## 2020-10-28 PROCEDURE — 90471 IMMUNIZATION ADMIN: CPT | Performed by: FAMILY MEDICINE

## 2020-10-28 PROCEDURE — 90746 HEPB VACCINE 3 DOSE ADULT IM: CPT | Performed by: FAMILY MEDICINE

## 2021-03-31 ENCOUNTER — OFFICE VISIT (OUTPATIENT)
Dept: FAMILY MEDICINE CLINIC | Age: 31
End: 2021-03-31
Payer: MEDICARE

## 2021-03-31 ENCOUNTER — HOSPITAL ENCOUNTER (OUTPATIENT)
Age: 31
Setting detail: SPECIMEN
Discharge: HOME | End: 2021-03-31
Payer: MEDICARE

## 2021-03-31 VITALS
SYSTOLIC BLOOD PRESSURE: 120 MMHG | WEIGHT: 136 LBS | TEMPERATURE: 98.3 F | HEIGHT: 62 IN | OXYGEN SATURATION: 98 % | DIASTOLIC BLOOD PRESSURE: 84 MMHG | BODY MASS INDEX: 25.03 KG/M2 | HEART RATE: 84 BPM

## 2021-03-31 DIAGNOSIS — F31.9 BIPOLAR AFFECTIVE DISORDER, REMISSION STATUS UNSPECIFIED (HCC): ICD-10-CM

## 2021-03-31 DIAGNOSIS — Z12.4 PAP SMEAR FOR CERVICAL CANCER SCREENING: Primary | ICD-10-CM

## 2021-03-31 DIAGNOSIS — B18.2 HEP C W/O COMA, CHRONIC (HCC): ICD-10-CM

## 2021-03-31 PROCEDURE — 99395 PREV VISIT EST AGE 18-39: CPT | Performed by: FAMILY MEDICINE

## 2021-03-31 PROCEDURE — G8484 FLU IMMUNIZE NO ADMIN: HCPCS | Performed by: FAMILY MEDICINE

## 2021-03-31 ASSESSMENT — ENCOUNTER SYMPTOMS
SINUS PRESSURE: 0
NAUSEA: 0
COLOR CHANGE: 0
FACIAL SWELLING: 0
WHEEZING: 0
COUGH: 0
SHORTNESS OF BREATH: 0
ABDOMINAL DISTENTION: 0
CHEST TIGHTNESS: 0
CONSTIPATION: 0
BLOOD IN STOOL: 0

## 2021-03-31 ASSESSMENT — PATIENT HEALTH QUESTIONNAIRE - PHQ9
2. FEELING DOWN, DEPRESSED OR HOPELESS: 0
SUM OF ALL RESPONSES TO PHQ QUESTIONS 1-9: 0
SUM OF ALL RESPONSES TO PHQ9 QUESTIONS 1 & 2: 0
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
## 2021-04-02 LAB
HPV SAMPLE: NORMAL
HPV, GENOTYPE 16: NOT DETECTED
HPV, GENOTYPE 18: NOT DETECTED
HPV, HIGH RISK OTHER: NOT DETECTED
HPV, INTERPRETATION: NORMAL
SPECIMEN DESCRIPTION: NORMAL

## 2021-04-05 LAB — CYTOLOGY REPORT: NORMAL

## 2021-06-30 ENCOUNTER — OFFICE VISIT (OUTPATIENT)
Dept: FAMILY MEDICINE CLINIC | Age: 31
End: 2021-06-30
Payer: MEDICARE

## 2021-06-30 ENCOUNTER — HOSPITAL ENCOUNTER (OUTPATIENT)
Age: 31
Setting detail: SPECIMEN
Discharge: HOME | End: 2021-06-30
Payer: MEDICARE

## 2021-06-30 VITALS
WEIGHT: 143 LBS | TEMPERATURE: 98.4 F | HEIGHT: 63 IN | OXYGEN SATURATION: 98 % | HEART RATE: 88 BPM | SYSTOLIC BLOOD PRESSURE: 116 MMHG | DIASTOLIC BLOOD PRESSURE: 76 MMHG | BODY MASS INDEX: 25.34 KG/M2

## 2021-06-30 DIAGNOSIS — B18.2 HEP C W/O COMA, CHRONIC (HCC): Primary | ICD-10-CM

## 2021-06-30 DIAGNOSIS — B18.2 HEP C W/O COMA, CHRONIC (HCC): ICD-10-CM

## 2021-06-30 LAB
ABSOLUTE EOS #: 0.2 K/UL (ref 0–0.4)
ABSOLUTE IMMATURE GRANULOCYTE: ABNORMAL K/UL (ref 0–0.3)
ABSOLUTE LYMPH #: 2.3 K/UL (ref 1–4.8)
ABSOLUTE MONO #: 0.6 K/UL (ref 0.1–1.3)
AFP: 0.6 UG/L
ALBUMIN SERPL-MCNC: 4.4 G/DL (ref 3.5–5.2)
ALBUMIN/GLOBULIN RATIO: ABNORMAL (ref 1–2.5)
ALP BLD-CCNC: 75 U/L (ref 35–104)
ALT SERPL-CCNC: 57 U/L (ref 5–33)
AMPHETAMINE SCREEN URINE: NEGATIVE
ANION GAP SERPL CALCULATED.3IONS-SCNC: 8 MMOL/L (ref 9–17)
AST SERPL-CCNC: 30 U/L
BARBITURATE SCREEN URINE: NEGATIVE
BASOPHILS # BLD: 1 % (ref 0–2)
BASOPHILS ABSOLUTE: 0 K/UL (ref 0–0.2)
BENZODIAZEPINE SCREEN, URINE: NEGATIVE
BILIRUB SERPL-MCNC: 0.17 MG/DL (ref 0.3–1.2)
BILIRUBIN DIRECT: <0.08 MG/DL
BILIRUBIN, INDIRECT: ABNORMAL MG/DL (ref 0–1)
BUN BLDV-MCNC: 10 MG/DL (ref 6–20)
BUPRENORPHINE URINE: ABNORMAL
CALCIUM SERPL-MCNC: 8.9 MG/DL (ref 8.6–10.4)
CANNABINOID SCREEN URINE: POSITIVE
CHLORIDE BLD-SCNC: 105 MMOL/L (ref 98–107)
CO2: 27 MMOL/L (ref 20–31)
COCAINE METABOLITE, URINE: NEGATIVE
CREAT SERPL-MCNC: 0.55 MG/DL (ref 0.5–0.9)
DIFFERENTIAL TYPE: ABNORMAL
EOSINOPHILS RELATIVE PERCENT: 3 % (ref 0–4)
ETHANOL PERCENT: 0.12 %
ETHANOL: 116 MG/DL
GFR AFRICAN AMERICAN: >60 ML/MIN
GFR NON-AFRICAN AMERICAN: >60 ML/MIN
GFR SERPL CREATININE-BSD FRML MDRD: ABNORMAL ML/MIN/{1.73_M2}
GFR SERPL CREATININE-BSD FRML MDRD: ABNORMAL ML/MIN/{1.73_M2}
GLUCOSE BLD-MCNC: 85 MG/DL (ref 70–99)
HBV SURFACE AB TITR SER: 56.83 MIU/ML
HCT VFR BLD CALC: 38.1 % (ref 36–46)
HEMOGLOBIN: 12.3 G/DL (ref 12–16)
HEPATITIS B CORE TOTAL ANTIBODY: NONREACTIVE
HEPATITIS C ANTIBODY: REACTIVE
HIV AG/AB: NONREACTIVE
IMMATURE GRANULOCYTES: ABNORMAL %
LYMPHOCYTES # BLD: 31 % (ref 24–44)
MCH RBC QN AUTO: 30.7 PG (ref 26–34)
MCHC RBC AUTO-ENTMCNC: 32.1 G/DL (ref 31–37)
MCV RBC AUTO: 95.4 FL (ref 80–100)
MDMA URINE: ABNORMAL
METHADONE SCREEN, URINE: NEGATIVE
METHAMPHETAMINE, URINE: ABNORMAL
MONOCYTES # BLD: 8 % (ref 1–7)
NRBC AUTOMATED: ABNORMAL PER 100 WBC
OPIATES, URINE: NEGATIVE
OXYCODONE SCREEN URINE: NEGATIVE
PDW BLD-RTO: 14.9 % (ref 11.5–14.9)
PHENCYCLIDINE, URINE: NEGATIVE
PLATELET # BLD: 287 K/UL (ref 150–450)
PLATELET ESTIMATE: ABNORMAL
PMV BLD AUTO: 7.7 FL (ref 6–12)
POTASSIUM SERPL-SCNC: 4.2 MMOL/L (ref 3.7–5.3)
PROPOXYPHENE, URINE: ABNORMAL
RBC # BLD: 4 M/UL (ref 4–5.2)
RBC # BLD: ABNORMAL 10*6/UL
SEG NEUTROPHILS: 57 % (ref 36–66)
SEGMENTED NEUTROPHILS ABSOLUTE COUNT: 4.2 K/UL (ref 1.3–9.1)
SODIUM BLD-SCNC: 140 MMOL/L (ref 135–144)
TEST INFORMATION: ABNORMAL
TOTAL PROTEIN: 7.7 G/DL (ref 6.4–8.3)
TRICYCLIC ANTIDEPRESSANTS, UR: ABNORMAL
WBC # BLD: 7.3 K/UL (ref 3.5–11)
WBC # BLD: ABNORMAL 10*3/UL

## 2021-06-30 PROCEDURE — 86704 HEP B CORE ANTIBODY TOTAL: CPT

## 2021-06-30 PROCEDURE — 1036F TOBACCO NON-USER: CPT | Performed by: FAMILY MEDICINE

## 2021-06-30 PROCEDURE — 82105 ALPHA-FETOPROTEIN SERUM: CPT

## 2021-06-30 PROCEDURE — 82977 ASSAY OF GGT: CPT

## 2021-06-30 PROCEDURE — 84460 ALANINE AMINO (ALT) (SGPT): CPT

## 2021-06-30 PROCEDURE — 99213 OFFICE O/P EST LOW 20 MIN: CPT | Performed by: FAMILY MEDICINE

## 2021-06-30 PROCEDURE — 36415 COLL VENOUS BLD VENIPUNCTURE: CPT

## 2021-06-30 PROCEDURE — 84450 TRANSFERASE (AST) (SGOT): CPT

## 2021-06-30 PROCEDURE — 87389 HIV-1 AG W/HIV-1&-2 AB AG IA: CPT

## 2021-06-30 PROCEDURE — 80053 COMPREHEN METABOLIC PANEL: CPT

## 2021-06-30 PROCEDURE — G0480 DRUG TEST DEF 1-7 CLASSES: HCPCS

## 2021-06-30 PROCEDURE — 82248 BILIRUBIN DIRECT: CPT

## 2021-06-30 PROCEDURE — 80307 DRUG TEST PRSMV CHEM ANLYZR: CPT

## 2021-06-30 PROCEDURE — 85025 COMPLETE CBC W/AUTO DIFF WBC: CPT

## 2021-06-30 PROCEDURE — G8419 CALC BMI OUT NRM PARAM NOF/U: HCPCS | Performed by: FAMILY MEDICINE

## 2021-06-30 PROCEDURE — 83883 ASSAY NEPHELOMETRY NOT SPEC: CPT

## 2021-06-30 PROCEDURE — 86803 HEPATITIS C AB TEST: CPT

## 2021-06-30 PROCEDURE — 84520 ASSAY OF UREA NITROGEN: CPT

## 2021-06-30 PROCEDURE — 86317 IMMUNOASSAY INFECTIOUS AGENT: CPT

## 2021-06-30 PROCEDURE — 86708 HEPATITIS A ANTIBODY: CPT

## 2021-06-30 PROCEDURE — G8427 DOCREV CUR MEDS BY ELIG CLIN: HCPCS | Performed by: FAMILY MEDICINE

## 2021-06-30 SDOH — ECONOMIC STABILITY: FOOD INSECURITY: WITHIN THE PAST 12 MONTHS, THE FOOD YOU BOUGHT JUST DIDN'T LAST AND YOU DIDN'T HAVE MONEY TO GET MORE.: NEVER TRUE

## 2021-06-30 SDOH — ECONOMIC STABILITY: FOOD INSECURITY: WITHIN THE PAST 12 MONTHS, YOU WORRIED THAT YOUR FOOD WOULD RUN OUT BEFORE YOU GOT MONEY TO BUY MORE.: NEVER TRUE

## 2021-06-30 ASSESSMENT — ENCOUNTER SYMPTOMS
SHORTNESS OF BREATH: 0
ABDOMINAL DISTENTION: 0
COUGH: 0
BACK PAIN: 0
CONSTIPATION: 0
ABDOMINAL PAIN: 0
WHEEZING: 0
BLOOD IN STOOL: 0

## 2021-06-30 ASSESSMENT — PATIENT HEALTH QUESTIONNAIRE - PHQ9
2. FEELING DOWN, DEPRESSED OR HOPELESS: 1
SUM OF ALL RESPONSES TO PHQ QUESTIONS 1-9: 2
SUM OF ALL RESPONSES TO PHQ9 QUESTIONS 1 & 2: 2
1. LITTLE INTEREST OR PLEASURE IN DOING THINGS: 1

## 2021-06-30 ASSESSMENT — SOCIAL DETERMINANTS OF HEALTH (SDOH): HOW HARD IS IT FOR YOU TO PAY FOR THE VERY BASICS LIKE FOOD, HOUSING, MEDICAL CARE, AND HEATING?: NOT HARD AT ALL

## 2021-06-30 NOTE — PROGRESS NOTES
Chief Complaint   Patient presents with    Referral - General     Pt is requesting help with trying to get a hold of     Immunizations     Declined or unsure of covid vaccine. Thanh Vaz  here today for follow up on chronic medical problems, go over labs and/or diagnostic studies, and medication refills. Referral - General (Pt is requesting help with trying to get a hold of ) and Immunizations (Declined or unsure of covid vaccine.)      HPI: Patient is here to get update on hepatitis C. Patient was referred to GI was seen last year, reports she had blood work ordered she has not done that. Patient reports she has tried to get in contact with GI to schedule follow-up appointment but she was kept on hold. She needs labs reprinted before her appointment. /76   Pulse 88   Temp 98.4 °F (36.9 °C)   Ht 5' 3\" (1.6 m)   Wt 143 lb (64.9 kg)   SpO2 98%   BMI 25.33 kg/m²    Body mass index is 25.33 kg/m². Wt Readings from Last 3 Encounters:   06/30/21 143 lb (64.9 kg)   03/31/21 136 lb (61.7 kg)   09/28/20 132 lb 12.8 oz (60.2 kg)        []Negative depression screening. PHQ Scores 6/30/2021 3/31/2021 6/10/2020   PHQ2 Score 2 0 0   PHQ9 Score 2 0 0      [x]1-4 = Minimal depression   []5-9 = Milddepression   []10-14 = Moderate depression   []15-19 = Moderately severe depression   []20-27 = Severe depression    Discussed testing with the patient and all questions fully answered. Hospital Outpatient Visit on 03/31/2021   Component Date Value Ref Range Status    Specimen Description 03/31/2021 . CERVIX   Final    HPV Sample 03/31/2021 . THIN PREP   Final    HPV, Genotype 16 03/31/2021 Not Detected  Not Detected Final    HPV, Genotype 18 03/31/2021 Not Detected  Not Detected Final    HPV, High Risk Other 03/31/2021 Not Detected  Not Detected Final    HPV, Interpretation 03/31/2021        Final    Comment:  This test amplifies and detects DNA of 14 high-risk HPV types associated with cervical   cancer and its precursor lesions (HPV types 16,18, 31, 33, 35, 39, 45, 51, 52, 56, 58, 59,   66, and 68). Sensitivity may be affected by specimen collection methods, stage of infection, and the   presence of interfering substances. Results should be interpreted in conjunction with other available laboratory and clinical   data. A negative high-risk HPV result does not exclude the possibility of future cytologic HSIL or   underlying CIN2-3 or cancer. This test is intended for medical purposes only and is not valid for the evaluation of   suspected sexual abuse or for other forensic purposes.  Cytology Report 03/31/2021    Final-Edited                    Value:INTERPRETATION    Cervical material, (ThinPrep vial, Imaging-assisted review):  Specimen Adequacy:       Satisfactory for evaluation.       - Endocervical/transformation zone component present. Descriptive Diagnosis:       Negative for intraepithelial lesion or malignancy. Shift in gibran suggestive of bacterial vaginosis. Comments:       High Risk HPV testing was ordered. Cytotechnologist:   HARPER Fofana(ASCP)  **Electronically Signed Out**  katerina/4/5/2021          Procedure/Addendum  HPV Procedure Report     Date Ordered:     4/1/2021     Status: Signed  Out       Date Complete:     4/1/2021     By: HARPER Moore(ASCP)       Date Reported:     4/6/2021       INTERPRETATION  Roche HPV DNA High Risk                                  HPV Sample               Thin Prep                    (Ref Range)  HPV Type 16               Not Detected                    (Not  Detected)  HPV Type 18               Not Detected                    (Not  Detected)  Other High Risk HPV     Not Dete                          cted                    (Not Detected)       This test amplifies and detects DNA of 14 high-risk HPV types  associated with cervical cancer and its precursor lesions (HPV types  16, 18, 31, 33, 35, 39, 45, 51, 52, 56, 58, 59, 66, and 68). Sensitivity may be affected by specimen collection methods, stage of  infection, and the presence of interfering substances. Results should  be interpreted in conjunction with other available laboratory and  clinical data. A negative high-risk HPV result does not exclude the  possibility of future cytologic HSIL or underlying CIN2-3 or cancer. This test is intended for medical purposes only and is not valid for  the evaluation of suspected sexual abuse or for other forensic  purposes. Source:  1: Cervical material, (ThinPrep vial, Imaging-assisted review)    Clinical History  Z12.4 Encounter for screening for malignant neoplasm of cervix  Co-Test:  ThinPrep Pap with high risk HPV testing    GYNECOLOGIC CYTOLOGY REPORT    Patient N                          linda: Anastasia Negro [facility] Rec: [id number]  Path Number: [id number]  [employer]  [facility]  ANATOMIC PATHOLOGY  [address] [place] [address].   [place], [address]  [phone]  Fax: [phone]           Most recent labs reviewed:     Lab Results   Component Value Date    WBC 7.9 06/19/2020    HGB 12.7 06/19/2020    HCT 38.5 06/19/2020    MCV 90 06/19/2020     06/19/2020       @BRIEFLAB(NA,K,CL,CO2,BUN,CREATININE,GLUCOSE,CALCIUM)@     Lab Results   Component Value Date    ALT 46 (H) 06/19/2020    AST 25 06/19/2020    ALKPHOS 75 06/19/2020    BILITOT 0.6 06/19/2020       Lab Results   Component Value Date    TSH 1.95 06/19/2020       Lab Results   Component Value Date    CHOL 164 06/19/2020    CHOL 146 05/16/2018     Lab Results   Component Value Date    TRIG 78 06/19/2020    TRIG 139 05/16/2018     Lab Results   Component Value Date    HDL 41 06/19/2020    HDL 41 05/16/2018     Lab Results   Component Value Date    LDLCALC 107 06/19/2020    LDLCHOLESTEROL 77 05/16/2018     Lab Results   Component Value Date    VLDL 16 06/19/2020    VLDL NOT REPORTED 05/16/2018     Lab Results   Component Value Date    CHOLHDLRATIO 4.0 2020    CHOLHDLRATIO 3.6 2018       No results found for: LABA1C    No results found for: DIIRNQSX47    No results found for: FOLATE    No results found for: IRON, TIBC, FERRITIN    No results found for: VITD25          No current outpatient medications on file. No current facility-administered medications for this visit. Social History     Socioeconomic History    Marital status: Single     Spouse name: Not on file    Number of children: 2    Years of education: Not on file    Highest education level: Not on file   Occupational History     Employer: [employer]   Tobacco Use    Smoking status: Former Smoker     Packs/day: 0.50     Years: 3.00     Pack years: 1.50     Types: Cigarettes     Start date: 2010     Quit date: 2018     Years since quittin.9    Smokeless tobacco: Never Used   Substance and Sexual Activity    Alcohol use: Not Currently     Comment: socially    Drug use: Yes     Types: Marijuana, IV, Cocaine     Comment: free of IV drugs for 2 years    Sexual activity: Not Currently     Partners: Male   Other Topics Concern    Not on file   Social History Narrative    Not on file     Social Determinants of Health     Financial Resource Strain: Low Risk     Difficulty of Paying Living Expenses: Not hard at all   Food Insecurity: No Food Insecurity    Worried About Running Out of Food in the Last Year: Never true    Saul of Food in the Last Year: Never true   Transportation Needs:     Lack of Transportation (Medical):      Lack of Transportation (Non-Medical):    Physical Activity:     Days of Exercise per Week:     Minutes of Exercise per Session:    Stress:     Feeling of Stress :    Social Connections:     Frequency of Communication with Friends and Family:     Frequency of Social Gatherings with Friends and Family:     Attends Adventism Services:     Active Member of Clubs or Organizations:     Attends [employer] or Organization Meetings:     Marital Status:    Intimate Partner Violence:     Fear of Current or Ex-Partner:     Emotionally Abused:     Physically Abused:     Sexually Abused:      Counseling given: Not Answered        Family History   Problem Relation Age of Onset    Diabetes Mother         Grandparents    Cancer Other         Breast, great aunt             -rest of complaints with corresponding details per ROS    The patient's past medical, surgical, social, and family history as well as her current medications and allergies were reviewed as documented intoday's encounter. Review of Systems   Constitutional: Negative for activity change, appetite change, fatigue and fever. Eyes: Negative for visual disturbance. Respiratory: Negative for cough, shortness of breath and wheezing. Cardiovascular: Negative for chest pain, palpitations and leg swelling. Gastrointestinal: Negative for abdominal distention, abdominal pain, blood in stool and constipation. Genitourinary: Negative for frequency. Musculoskeletal: Negative for arthralgias, back pain and joint swelling. Neurological: Negative for speech difficulty, light-headedness and numbness. Psychiatric/Behavioral: Negative for agitation, decreased concentration and dysphoric mood. The patient is not nervous/anxious. Physical Exam    PHYSICAL EXAM:   VITALS:   Vitals:    06/30/21 1059   BP: 116/76   Pulse: 88   Temp: 98.4 °F (36.9 °C)   SpO2: 98%     GENERAL:  Patient is a well-developed, well-nourished female  in no acute distress, alert and oriented x3, appropriate and pleasant conversation. HEAD: Normocephalic, atraumatic. ENT: Moist mucous membranes. No erythema is noted. NECK: Supple. No masses. No lymphadenopathy. CARDIOVASCULAR: Regular rate and rhythm. PULMONARY: Lungs are clear to auscultation bilaterally. ABDOMEN: Soft, nontender, nondistended. Positive bowel sounds.     NEUROLOGIC: Cranial nerves II through XII grossly intact. No focal deficits are noted. ASSESSMENT AND PLAN      1. Hep C w/o coma, chronic (HCC)  All labs reprinted, contact number provided for GI. Discussed with patient to do blood work before appointment. No orders of the defined types were placed in this encounter. Medications Discontinued During This Encounter   Medication Reason    acetaminophen-codeine (TYLENOL #3) 300-30 MG per tablet Therapy completed       Gregorio Solo received counseling on the following healthy behaviors: nutrition and exercise  Reviewed prior labs and health maintenance  Continue current medications, diet and exercise. Discussed use, benefit, and side effects of prescribed medications. Barriers to medication compliance addressed. Patient given educational materials - see patient instructions  Was a self-tracking handout given in paper form or via [employer]? Yes    Requested Prescriptions      No prescriptions requested or ordered in this encounter       All patient questions answered. Patient voiced understanding. Quality Measures    Body mass index is 25.33 kg/m². Normal. Weight control planned discussed daily exercise regimen and Healthy diet and regular exercise. BP: 116/76 Blood pressure is normal. Treatment plan consists of No treatment change needed. Lab Results   Component Value Date    LDLCALC 107 06/19/2020    LDLCHOLESTEROL 77 05/16/2018    (goal LDL reduction with dx if diabetes is 50% LDL reduction)      PHQ Scores 6/30/2021 3/31/2021 6/10/2020   PHQ2 Score 2 0 0   PHQ9 Score 2 0 0     Interpretation of Total Score Depression Severity: 1-4 = Minimal depression, 5-9 = Mild depression, 10-14 = Moderate depression, 15-19 = Moderately severe depression, 20-27 = Severe depression    The patient'spast medical, surgical, social, and family history as well as her   current medications and allergies were reviewed as documented in today's encounter.       Medications, labs, diagnostic studies, consultations andfollow-up as documented in this encounter. Return if symptoms worsen or fail to improve. Patient wasseen with total face to face time of 20 minutes. More than 50% of this visit was counseling and education. No future appointments. This note was completed by using the assistance of a speech-recognition program. However, inadvertent computerized transcription errors may be present. Althoughevery effort was made to ensure accuracy, no guarantees can be provided that every mistake has been identified and corrected by editing.   Electronically signed by Silvino Kern MD on 6/30/2021  11:17 AM

## 2021-07-01 LAB — HAV AB SERPL IA-ACNC: REACTIVE

## 2021-07-03 LAB
ALANINE AMINOTRANSFERASE, FIBROMETER: 66 U/L (ref 5–40)
ALPHA-2-MACROGLOBULIN, FIBROMETER: 143 MG/DL (ref 131–293)
ASPARTATE AMINOTRANSFERASE, FIBROMETER: 36 U/L (ref 9–40)
CIRRHOMETER PATIENT SCORE: 0
EER FIBROMETER REPORT: ABNORMAL
FIBROMETER INTERPRETATION: ABNORMAL
FIBROMETER PATIENT SCORE: 0.1
FIBROMETER PLATELET COUNT: 287
FIBROMETER PROTHROMBIN INDEX: 90 % (ref 90–120)
FIBROSIS METAVIR CLASSIFICATION: ABNORMAL
GAMMA GLUTAMYL TRANSFERASE, FIBROMETER: 26 U/L (ref 7–33)
INFLAMETER METAVIR CLASSIFICATION: ABNORMAL
INFLAMETER PATIENT SCORE: 0.27
UREA NITROGEN, FIBROMETER: 10 MG/DL (ref 7–20)

## 2021-07-11 ENCOUNTER — APPOINTMENT (OUTPATIENT)
Dept: CT IMAGING | Age: 31
End: 2021-07-11
Payer: MEDICARE

## 2021-07-11 ENCOUNTER — APPOINTMENT (OUTPATIENT)
Dept: GENERAL RADIOLOGY | Age: 31
End: 2021-07-11
Payer: MEDICARE

## 2021-07-11 ENCOUNTER — HOSPITAL ENCOUNTER (EMERGENCY)
Age: 31
Discharge: HOME | End: 2021-07-11
Attending: EMERGENCY MEDICINE
Payer: MEDICARE

## 2021-07-11 VITALS
SYSTOLIC BLOOD PRESSURE: 124 MMHG | HEIGHT: 63 IN | TEMPERATURE: 98.3 F | OXYGEN SATURATION: 100 % | DIASTOLIC BLOOD PRESSURE: 86 MMHG | HEART RATE: 99 BPM | BODY MASS INDEX: 25.33 KG/M2 | RESPIRATION RATE: 18 BRPM

## 2021-07-11 DIAGNOSIS — S20.219A CONTUSION OF CHEST WALL, UNSPECIFIED LATERALITY, INITIAL ENCOUNTER: Primary | ICD-10-CM

## 2021-07-11 DIAGNOSIS — S00.83XA CONTUSION OF FACE, INITIAL ENCOUNTER: ICD-10-CM

## 2021-07-11 LAB
ABSOLUTE EOS #: 0 K/UL (ref 0–0.4)
ABSOLUTE IMMATURE GRANULOCYTE: ABNORMAL K/UL (ref 0–0.3)
ABSOLUTE LYMPH #: 1.5 K/UL (ref 1–4.8)
ABSOLUTE MONO #: 0.9 K/UL (ref 0.1–1.3)
ANION GAP SERPL CALCULATED.3IONS-SCNC: 13 MMOL/L (ref 9–17)
BASOPHILS # BLD: 1 % (ref 0–2)
BASOPHILS ABSOLUTE: 0.1 K/UL (ref 0–0.2)
BUN BLDV-MCNC: 13 MG/DL (ref 6–20)
BUN/CREAT BLD: ABNORMAL (ref 9–20)
CALCIUM SERPL-MCNC: 9.2 MG/DL (ref 8.6–10.4)
CHLORIDE BLD-SCNC: 100 MMOL/L (ref 98–107)
CO2: 24 MMOL/L (ref 20–31)
CREAT SERPL-MCNC: 0.56 MG/DL (ref 0.5–0.9)
DIFFERENTIAL TYPE: ABNORMAL
EOSINOPHILS RELATIVE PERCENT: 0 % (ref 0–4)
GFR AFRICAN AMERICAN: >60 ML/MIN
GFR NON-AFRICAN AMERICAN: >60 ML/MIN
GFR SERPL CREATININE-BSD FRML MDRD: ABNORMAL ML/MIN/{1.73_M2}
GFR SERPL CREATININE-BSD FRML MDRD: ABNORMAL ML/MIN/{1.73_M2}
GLUCOSE BLD-MCNC: 108 MG/DL (ref 70–99)
HCG QUALITATIVE: NEGATIVE
HCT VFR BLD CALC: 41.8 % (ref 36–46)
HEMOGLOBIN: 14.3 G/DL (ref 12–16)
IMMATURE GRANULOCYTES: ABNORMAL %
LYMPHOCYTES # BLD: 14 % (ref 24–44)
MCH RBC QN AUTO: 31.9 PG (ref 26–34)
MCHC RBC AUTO-ENTMCNC: 34.2 G/DL (ref 31–37)
MCV RBC AUTO: 93.1 FL (ref 80–100)
MONOCYTES # BLD: 9 % (ref 1–7)
NRBC AUTOMATED: ABNORMAL PER 100 WBC
PDW BLD-RTO: 14.8 % (ref 11.5–14.9)
PLATELET # BLD: 262 K/UL (ref 150–450)
PLATELET ESTIMATE: ABNORMAL
PMV BLD AUTO: 7.2 FL (ref 6–12)
POTASSIUM SERPL-SCNC: 4 MMOL/L (ref 3.7–5.3)
RBC # BLD: 4.49 M/UL (ref 4–5.2)
RBC # BLD: ABNORMAL 10*6/UL
SEG NEUTROPHILS: 76 % (ref 36–66)
SEGMENTED NEUTROPHILS ABSOLUTE COUNT: 7.9 K/UL (ref 1.3–9.1)
SODIUM BLD-SCNC: 137 MMOL/L (ref 135–144)
TROPONIN INTERP: NORMAL
TROPONIN T: NORMAL NG/ML
TROPONIN, HIGH SENSITIVITY: <6 NG/L (ref 0–14)
WBC # BLD: 10.4 K/UL (ref 3.5–11)
WBC # BLD: ABNORMAL 10*3/UL

## 2021-07-11 PROCEDURE — 99284 EMERGENCY DEPT VISIT MOD MDM: CPT

## 2021-07-11 PROCEDURE — 80048 BASIC METABOLIC PNL TOTAL CA: CPT

## 2021-07-11 PROCEDURE — 36415 COLL VENOUS BLD VENIPUNCTURE: CPT

## 2021-07-11 PROCEDURE — 85025 COMPLETE CBC W/AUTO DIFF WBC: CPT

## 2021-07-11 PROCEDURE — 70450 CT HEAD/BRAIN W/O DYE: CPT

## 2021-07-11 PROCEDURE — 71046 X-RAY EXAM CHEST 2 VIEWS: CPT

## 2021-07-11 PROCEDURE — 84484 ASSAY OF TROPONIN QUANT: CPT

## 2021-07-11 PROCEDURE — 70486 CT MAXILLOFACIAL W/O DYE: CPT

## 2021-07-11 PROCEDURE — 6370000000 HC RX 637 (ALT 250 FOR IP): Performed by: EMERGENCY MEDICINE

## 2021-07-11 PROCEDURE — 93005 ELECTROCARDIOGRAM TRACING: CPT | Performed by: EMERGENCY MEDICINE

## 2021-07-11 PROCEDURE — 84703 CHORIONIC GONADOTROPIN ASSAY: CPT

## 2021-07-11 RX ORDER — CYCLOBENZAPRINE HCL 5 MG
5 TABLET ORAL 2 TIMES DAILY PRN
Qty: 10 TABLET | Refills: 0 | Status: SHIPPED | OUTPATIENT
Start: 2021-07-11 | End: 2021-07-23 | Stop reason: SDUPTHER

## 2021-07-11 RX ORDER — CYCLOBENZAPRINE HCL 10 MG
10 TABLET ORAL ONCE
Status: COMPLETED | OUTPATIENT
Start: 2021-07-11 | End: 2021-07-11

## 2021-07-11 RX ORDER — HYDROCODONE BITARTRATE AND ACETAMINOPHEN 5; 325 MG/1; MG/1
1 TABLET ORAL EVERY 6 HOURS PRN
Qty: 10 TABLET | Refills: 0 | Status: SHIPPED | OUTPATIENT
Start: 2021-07-11 | End: 2021-07-14

## 2021-07-11 RX ORDER — HYDROCODONE BITARTRATE AND ACETAMINOPHEN 5; 325 MG/1; MG/1
2 TABLET ORAL ONCE
Status: COMPLETED | OUTPATIENT
Start: 2021-07-11 | End: 2021-07-11

## 2021-07-11 RX ADMIN — CYCLOBENZAPRINE HYDROCHLORIDE 10 MG: 10 TABLET, FILM COATED ORAL at 10:41

## 2021-07-11 RX ADMIN — HYDROCODONE BITARTRATE AND ACETAMINOPHEN 2 TABLET: 5; 325 TABLET ORAL at 10:41

## 2021-07-11 ASSESSMENT — ENCOUNTER SYMPTOMS
COLOR CHANGE: 0
VOMITING: 0
BLOOD IN STOOL: 0
CONSTIPATION: 0
SORE THROAT: 0
TROUBLE SWALLOWING: 0
ABDOMINAL PAIN: 0
DIARRHEA: 0
COUGH: 0
BACK PAIN: 0
FACIAL SWELLING: 1
SHORTNESS OF BREATH: 0
NAUSEA: 0

## 2021-07-11 ASSESSMENT — PAIN DESCRIPTION - LOCATION: LOCATION: CHEST;HEAD

## 2021-07-11 ASSESSMENT — PAIN DESCRIPTION - PAIN TYPE: TYPE: ACUTE PAIN

## 2021-07-11 ASSESSMENT — PAIN SCALES - GENERAL
PAINLEVEL_OUTOF10: 8
PAINLEVEL_OUTOF10: 8
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
## 2021-07-11 NOTE — LETTER
[facility] Emergency Department  [address] [facility], [address]             July 11, 2021    Patient: Debbie Morris   YOB: 1990   Date of Visit: 7/11/2021       To Whom It May Concern:    Rosalva Smith was seen and treated in our emergency department on 7/11/2021. She may return to work 7/13/21.       Sincerely,     Mitch Lopez.

## 2021-07-12 ENCOUNTER — TELEPHONE (OUTPATIENT)
Dept: FAMILY MEDICINE CLINIC | Age: 31
End: 2021-07-12

## 2021-07-12 NOTE — TELEPHONE ENCOUNTER
[facility] ([facility]) ED Follow up Call    Reason for ED visit:           [address] [facility] , this is Xiomara Leaks from Dr. Rios Re office, just calling to see how you are doing after your recent ED visit. Did you receive discharge instructions? Yes  Do you understand the discharge instructions? Yes  Did the ED give you any new prescriptions? Yes  Were you able to fill your prescriptions? Yes      Do you have one of our red, yellow and green  Zone sheets that help you to determine when you should go to the ED? Yes      Do you need or want to make a follow up appt with your PCP? yes    Do you have any further needs in the home i.e. Equipment? Not Applicable        FU appts/Provider:    No future appointments.

## 2021-07-13 LAB
EKG ATRIAL RATE: 85 BPM
EKG P AXIS: 58 DEGREES
EKG P-R INTERVAL: 118 MS
EKG Q-T INTERVAL: 354 MS
EKG QRS DURATION: 86 MS
EKG QTC CALCULATION (BAZETT): 421 MS
EKG R AXIS: 83 DEGREES
EKG T AXIS: 45 DEGREES
EKG VENTRICULAR RATE: 85 BPM

## 2021-07-13 PROCEDURE — 93010 ELECTROCARDIOGRAM REPORT: CPT | Performed by: INTERNAL MEDICINE

## 2021-07-23 ENCOUNTER — OFFICE VISIT (OUTPATIENT)
Dept: FAMILY MEDICINE CLINIC | Age: 31
End: 2021-07-23
Payer: MEDICARE

## 2021-07-23 VITALS
OXYGEN SATURATION: 98 % | SYSTOLIC BLOOD PRESSURE: 120 MMHG | DIASTOLIC BLOOD PRESSURE: 80 MMHG | BODY MASS INDEX: 24.8 KG/M2 | HEART RATE: 69 BPM | TEMPERATURE: 97.9 F | WEIGHT: 140 LBS | HEIGHT: 63 IN
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
## 2021-07-23 DIAGNOSIS — S00.83XA CONTUSION OF FACE, INITIAL ENCOUNTER: ICD-10-CM

## 2021-07-23 DIAGNOSIS — S20.219A CONTUSION OF CHEST WALL, UNSPECIFIED LATERALITY, INITIAL ENCOUNTER: Primary | ICD-10-CM

## 2021-07-23 DIAGNOSIS — M25.512 ACUTE PAIN OF BOTH SHOULDERS: ICD-10-CM

## 2021-07-23 DIAGNOSIS — M25.511 ACUTE PAIN OF BOTH SHOULDERS: ICD-10-CM

## 2021-07-23 PROCEDURE — 1036F TOBACCO NON-USER: CPT | Performed by: FAMILY MEDICINE

## 2021-07-23 PROCEDURE — G8420 CALC BMI NORM PARAMETERS: HCPCS | Performed by: FAMILY MEDICINE

## 2021-07-23 PROCEDURE — 99214 OFFICE O/P EST MOD 30 MIN: CPT | Performed by: FAMILY MEDICINE

## 2021-07-23 PROCEDURE — G8427 DOCREV CUR MEDS BY ELIG CLIN: HCPCS | Performed by: FAMILY MEDICINE

## 2021-07-23 RX ORDER — IBUPROFEN 800 MG/1
800 TABLET ORAL EVERY 8 HOURS PRN
Qty: 30 TABLET | Refills: 0 | Status: SHIPPED | OUTPATIENT
Start: 2021-07-23 | End: 2021-08-02

## 2021-07-23 RX ORDER — CYCLOBENZAPRINE HCL 10 MG
10 TABLET ORAL NIGHTLY PRN
Qty: 30 TABLET | Refills: 0 | Status: SHIPPED | OUTPATIENT
Start: 2021-07-23

## 2021-07-23 RX ORDER — ACETAMINOPHEN 500 MG
500 TABLET ORAL EVERY 6 HOURS PRN
Qty: 120 TABLET | Refills: 3 | Status: SHIPPED | OUTPATIENT
Start: 2021-07-23

## 2021-07-23 RX ORDER — LIDOCAINE 40 MG/G
CREAM TOPICAL
Qty: 45 G | Refills: 3 | Status: SHIPPED | OUTPATIENT
Start: 2021-07-23

## 2021-07-23 ASSESSMENT — PATIENT HEALTH QUESTIONNAIRE - PHQ9
SUM OF ALL RESPONSES TO PHQ QUESTIONS 1-9: 0
SUM OF ALL RESPONSES TO PHQ QUESTIONS 1-9: 0
1. LITTLE INTEREST OR PLEASURE IN DOING THINGS: 0
SUM OF ALL RESPONSES TO PHQ9 QUESTIONS 1 & 2: 0
SUM OF ALL RESPONSES TO PHQ QUESTIONS 1-9: 0
2. FEELING DOWN, DEPRESSED OR HOPELESS: 0

## 2021-07-23 ASSESSMENT — ENCOUNTER SYMPTOMS
CHEST TIGHTNESS: 0
VOMITING: 0
WHEEZING: 0
SHORTNESS OF BREATH: 0
ABDOMINAL PAIN: 0
ABDOMINAL DISTENTION: 0
NAUSEA: 0
CONSTIPATION: 0
DIARRHEA: 0
COUGH: 0

## 2021-07-23 NOTE — PROGRESS NOTES
Visit Information    Have you changed or started any medications since your last visit including any over-the-counter medicines, vitamins, or herbal medicines? no   Are you having any side effects from any of your medications? -  no  Have you stopped taking any of your medications? Is so, why? -  no    Have you seen any other physician or provider since your last visit? No  Have you had any other diagnostic tests since your last visit? Yes - Records Obtained  Have you been seen in the emergency room and/or had an admission to a hospital since we last saw you? Yes - Records Obtained  Have you had your routine dental cleaning in the past 6 months? no    Have you activated your [employer] account? If not, what are your barriers?  Yes     Patient Care Team:  Jenifer Soto MD as PCP - General (Family Medicine)  Jenifer Soto MD as PCP - [facility]    Medical History Review  Past Medical, Family, and Social History reviewed and does contribute to the patient presenting condition    Health Maintenance   Topic Date Due    COVID-19 Vaccine (1) Never done    Flu vaccine (1) 09/01/2021    Cervical cancer screen  03/31/2026    DTaP/Tdap/Td vaccine (2 - Td or Tdap) 09/28/2030    Pneumococcal 0-64 years Vaccine (2 of 2 - PPSV23) 10/10/2054    Hepatitis A vaccine  Completed    HIV screen  Completed    Hib vaccine  Aged Out    Meningococcal (ACWY) vaccine  Aged Out    Hepatitis B vaccine  Discontinued    Varicella vaccine  Discontinued
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
## 2021-07-23 NOTE — LETTER
[facility]  [address] [place] [address] [address] [facility] [address]  Phone: [phone]  Fax: [phone]    Charmaine Lopez MD        July 23, 2021     Patient: Gladys Woods   YOB: 1990   Date of Visit: 7/23/2021       To Whom It May Concern: It is my medical opinion that Radha Stringer should remain out of work until 7/29/21. return to work on 7/30/21. If you have any questions or concerns, please don't hesitate to call.     Sincerely,        Charmaine Lopez MD

## 2021-07-24 PROBLEM — F19.11 HISTORY OF DRUG ABUSE (HCC): Status: ACTIVE | Noted: 2019-06-20

## 2021-08-26 ENCOUNTER — TELEPHONE (OUTPATIENT)
Dept: GASTROENTEROLOGY | Age: 31
End: 2021-08-26

## 2021-11-12 ENCOUNTER — OFFICE VISIT (OUTPATIENT)
Dept: GASTROENTEROLOGY | Age: 31
End: 2021-11-12
Payer: MEDICARE

## 2021-11-12 VITALS — SYSTOLIC BLOOD PRESSURE: 117 MMHG | BODY MASS INDEX: 25.15 KG/M2 | WEIGHT: 142 LBS | DIASTOLIC BLOOD PRESSURE: 58 MMHG

## 2021-11-12 DIAGNOSIS — B18.2 CHRONIC HEPATITIS C WITHOUT HEPATIC COMA (HCC): Primary | ICD-10-CM

## 2021-11-12 PROCEDURE — G8419 CALC BMI OUT NRM PARAM NOF/U: HCPCS | Performed by: INTERNAL MEDICINE

## 2021-11-12 PROCEDURE — 1036F TOBACCO NON-USER: CPT | Performed by: INTERNAL MEDICINE

## 2021-11-12 PROCEDURE — G8484 FLU IMMUNIZE NO ADMIN: HCPCS | Performed by: INTERNAL MEDICINE

## 2021-11-12 PROCEDURE — 99213 OFFICE O/P EST LOW 20 MIN: CPT | Performed by: INTERNAL MEDICINE

## 2021-11-12 PROCEDURE — G8427 DOCREV CUR MEDS BY ELIG CLIN: HCPCS | Performed by: INTERNAL MEDICINE

## 2021-11-12 ASSESSMENT — ENCOUNTER SYMPTOMS
RESPIRATORY NEGATIVE: 1
ALLERGIC/IMMUNOLOGIC NEGATIVE: 1
BACK PAIN: 1
ABDOMINAL PAIN: 0
EYES NEGATIVE: 1

## 2021-11-12 NOTE — PROGRESS NOTES
GI FOLLOW UP    INTERVAL HISTORY:       Last seen more than 1 year ago  Failed to follow-up    Chief Complaint   Patient presents with    Follow-up     Hep C       Chronic hepatitis C    HISTORY OF PRESENT ILLNESS: Reid Goodrich is a 26 y.o. female with a past history remarkable for prior history of polysubstance abuse, IVDA, last used 11 months ago, referred for evaluation of chronic hepatitis C        IVDA- last used 2 yrs ago, used for 11 yrs. Heroin. Smoking crack. Active smoker- cannibus,   No tobacco  Social drinker  CCY   FH-  Father with heroin addiction, Mother with heroin addiction.     Chronic Hepatitis C- GT 3, first known to patient 3 yrs ago. VL of 52,000. Pending liver US. No evidence of cirrhosis on examination or serological evidence of cirrhosis. Past Medical,Family, and Social History reviewed and does contribute to the patient presenting condition. Patient's PMH/PSH,SH,PSYCH Hx, MEDs, ALLERGIES, and ROS were all reviewed and updated in the appropriate sections. PAST MEDICAL HISTORY:  Past Medical History:   Diagnosis Date    Bipolar disorder ([facility] 7/22/2014    Gall stones     Psychiatric problem     Seizure ([facility] 9/17/2014    Substance abuse ([facility]        Past Surgical History:   Procedure Laterality Date    CHOLECYSTECTOMY      GALLBLADDER SURGERY         CURRENT MEDICATIONS:    Current Outpatient Medications:     cyclobenzaprine (FLEXERIL) 10 MG tablet, Take 1 tablet by mouth nightly as needed for Muscle spasms, Disp: 30 tablet, Rfl: 0    acetaminophen (TYLENOL) 500 MG tablet, Take 1 tablet by mouth every 6 hours as needed for Pain, Disp: 120 tablet, Rfl: 3    lidocaine (LMX) 4 % cream, Apply topically 1-2 times/day as needed. , Disp: 45 g, Rfl: 3    ibuprofen (ADVIL;MOTRIN) 800 MG tablet, Take 1 tablet by mouth every 8 hours as needed for Pain, Disp: 30 tablet, Rfl: 0    ALLERGIES:   Allergies   Allergen Reactions    Haldol [Haloperidol Lactate] Other (See Comments)     Acute Dystonia       FAMILY HISTORY:       Problem Relation Age of Onset    Diabetes Mother         Grandparents    Cancer Other         Breast, great aunt         SOCIAL HISTORY:   Social History     Socioeconomic History    Marital status: Single     Spouse name: Not on file    Number of children: 2    Years of education: Not on file    Highest education level: Not on file   Occupational History     Employer: [employer]   Tobacco Use    Smoking status: Former Smoker     Packs/day: 0.50     Years: 3.00     Pack years: 1.50     Types: Cigarettes     Start date: 7/13/2010     Quit date: 7/13/2018     Years since quitting: 3.3    Smokeless tobacco: Never Used   Substance and Sexual Activity    Alcohol use: Not Currently     Comment: socially    Drug use: Yes     Types: Marijuana (Louise Rosario), IV, Cocaine     Comment: free of IV drugs for 2 years    Sexual activity: Not Currently     Partners: Male   Other Topics Concern    Not on file   Social History Narrative    Not on file     Social Determinants of Health     Financial Resource Strain: Low Risk     Difficulty of Paying Living Expenses: Not hard at all   Food Insecurity: No Food Insecurity    Worried About Running Out of Food in the Last Year: Never true    Saul of Food in the Last Year: Never true   Transportation Needs:     Lack of Transportation (Medical): Not on file    Lack of Transportation (Non-Medical):  Not on file   Physical Activity:     Days of Exercise per Week: Not on file    Minutes of Exercise per Session: Not on file   Stress:     Feeling of Stress : Not on file   Social Connections:     Frequency of Communication with Friends and Family: Not on file    Frequency of Social Gatherings with Friends and Family: Not on file    Attends Gnosticism Services: Not on file    Active Member of Clubs or Organizations: to person, place, and time. Patient appears well-developed and well-nourished. HENT:   Head: Normocephalic and atraumatic. Eyes: Pupils are equal, round, and reactive to light. EOM are normal.   Neck: Normal range of motion. Neck supple. No JVD present. No tracheal deviation present. No thyromegaly present. Cardiovascular: Normal rate, regular rhythm, normal heart sounds and intact distal pulses. Pulmonary/Chest: Effort normal and breath sounds normal. No stridor. No respiratory distress. He has no wheezes. He has no rales. He exhibits no tenderness. Abdominal: Soft. Bowel sounds are normal. He exhibits no distension and no mass. There is no tenderness. There is no rebound and no guarding. No hernia. Musculoskeletal: Normal range of motion. Lymphadenopathy:    Patient has no cervical adenopathy. Neurological: Patient is alert and oriented to person, place, and time. Psychiatric: Patient has a normal mood and affect. Patient behavior is normal.       LABORATORY DATA: Reviewed  Lab Results   Component Value Date    WBC 10.4 07/11/2021    HGB 14.3 07/11/2021    HCT 41.8 07/11/2021    MCV 93.1 07/11/2021     07/11/2021     07/11/2021    K 4.0 07/11/2021     07/11/2021    CO2 24 07/11/2021    BUN 13 07/11/2021    CREATININE 0.56 07/11/2021    LABALBU 4.4 06/30/2021    BILITOT 0.17 (L) 06/30/2021    ALKPHOS 75 06/30/2021    AST 30 06/30/2021    ALT 57 (H) 06/30/2021    INR 1.1 09/20/2018         Lab Results   Component Value Date    RBC 4.49 07/11/2021    HGB 14.3 07/11/2021    MCV 93.1 07/11/2021    MCH 31.9 07/11/2021    MCHC 34.2 07/11/2021    RDW 14.8 07/11/2021    MPV 7.2 07/11/2021    BASOPCT 1 07/11/2021    LYMPHSABS 1.50 07/11/2021    MONOSABS 0.90 07/11/2021    NEUTROABS 7.90 07/11/2021    EOSABS 0.00 07/11/2021    BASOSABS 0.10 07/11/2021         DIAGNOSTIC TESTING:     No results found.            IMPRESSION: Ruby Heard is a 27 y.o. female with a past history remarkable for prior history of polysubstance abuse, IVDA, last used 11 months ago, referred for evaluation of chronic hepatitis C        IVDA- last used 2 yrs ago, used for 11 yrs. Heroin. Smoking crack. Active smoker- cannibus,   No tobacco  Social drinker  CCY   FH-  Father with heroin addiction, Mother with heroin addiction.     Chronic Hepatitis C- GT 3, first known to patient 3 yrs ago. VL of 52,000. Pending liver US. No evidence of cirrhosis on examination or serological evidence of cirrhosis.         Assessment  Chronic hepatitis C      PLAN:    1) chronic hepatitis Cgenotype 3, treatment naïvefailed to follow-up with GI clinic for treatment candidacy. Currently the patient is in pregnancy, second trimester. Treatment deferred until after delivery. Noncirrhotic patient. 2) we will obtain liver ultrasound    3) follow-up after 2 to 3 months. Thank you for allowing me to participate in the care of Ms. Joel Massey. For any further questions please do not hesitate to contact me. I have reviewed and agree with the ROS entered by the MA/LPN from today's encounter documented in a separate note. Delores Rios MD, MPH   [facility]  Office #: [phone]    this note is created with the assistance of a speech recognition program.  While intending to generate a document that actually reflects the content of the visit, the document can still have some errors including those of syntax and sound a like substitutions which may escape proof reading. It such instances, actual meaning can be extrapolated by contextual diversion.

## 2022-02-25 ENCOUNTER — TELEPHONE (OUTPATIENT)
Dept: GASTROENTEROLOGY | Age: 32
End: 2022-02-25
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
## 2022-11-22 ENCOUNTER — OFFICE VISIT (OUTPATIENT)
Dept: FAMILY MEDICINE CLINIC | Age: 32
End: 2022-11-22
Payer: MEDICARE

## 2022-11-22 ENCOUNTER — HOSPITAL ENCOUNTER (OUTPATIENT)
Age: 32
Setting detail: SPECIMEN
Discharge: HOME | End: 2022-11-22

## 2022-11-22 VITALS
HEART RATE: 115 BPM | BODY MASS INDEX: 22.15 KG/M2 | WEIGHT: 125 LBS | DIASTOLIC BLOOD PRESSURE: 84 MMHG | HEIGHT: 63 IN | TEMPERATURE: 97.5 F | SYSTOLIC BLOOD PRESSURE: 126 MMHG | OXYGEN SATURATION: 98 %

## 2022-11-22 DIAGNOSIS — R09.89 SYMPTOMS OF UPPER RESPIRATORY INFECTION (URI): ICD-10-CM

## 2022-11-22 DIAGNOSIS — H66.001 NON-RECURRENT ACUTE SUPPURATIVE OTITIS MEDIA OF RIGHT EAR WITHOUT SPONTANEOUS RUPTURE OF TYMPANIC MEMBRANE: Primary | ICD-10-CM

## 2022-11-22 PROCEDURE — G8427 DOCREV CUR MEDS BY ELIG CLIN: HCPCS | Performed by: NURSE PRACTITIONER

## 2022-11-22 PROCEDURE — G8484 FLU IMMUNIZE NO ADMIN: HCPCS | Performed by: NURSE PRACTITIONER

## 2022-11-22 PROCEDURE — 99213 OFFICE O/P EST LOW 20 MIN: CPT | Performed by: NURSE PRACTITIONER

## 2022-11-22 PROCEDURE — G8420 CALC BMI NORM PARAMETERS: HCPCS | Performed by: NURSE PRACTITIONER

## 2022-11-22 PROCEDURE — 1036F TOBACCO NON-USER: CPT | Performed by: NURSE PRACTITIONER

## 2022-11-22 RX ORDER — BENZONATATE 100 MG/1
100 CAPSULE ORAL 3 TIMES DAILY PRN
Qty: 21 CAPSULE | Refills: 0 | Status: SHIPPED | OUTPATIENT
Start: 2022-11-22 | End: 2022-11-29

## 2022-11-22 RX ORDER — CETIRIZINE HYDROCHLORIDE 10 MG/1
10 TABLET ORAL DAILY
Qty: 30 TABLET | Refills: 0 | Status: SHIPPED | OUTPATIENT
Start: 2022-11-22 | End: 2022-12-22

## 2022-11-22 RX ORDER — CETIRIZINE HYDROCHLORIDE 10 MG/1
TABLET ORAL
COMMUNITY
Start: 2022-05-06

## 2022-11-22 RX ORDER — AMOXICILLIN AND CLAVULANATE POTASSIUM 875; 125 MG/1; MG/1
1 TABLET, FILM COATED ORAL 2 TIMES DAILY
Qty: 20 TABLET | Refills: 0 | Status: SHIPPED | OUTPATIENT
Start: 2022-11-22 | End: 2022-12-02

## 2022-11-22 RX ORDER — FAMOTIDINE 20 MG
1 TABLET ORAL DAILY
COMMUNITY
Start: 2022-01-04

## 2022-11-22 RX ORDER — FLUTICASONE PROPIONATE 50 MCG
2 SPRAY, SUSPENSION (ML) NASAL DAILY
Qty: 16 G | Refills: 0 | Status: SHIPPED | OUTPATIENT
Start: 2022-11-22

## 2022-11-22 SDOH — ECONOMIC STABILITY: FOOD INSECURITY: WITHIN THE PAST 12 MONTHS, YOU WORRIED THAT YOUR FOOD WOULD RUN OUT BEFORE YOU GOT MONEY TO BUY MORE.: NEVER TRUE

## 2022-11-22 SDOH — ECONOMIC STABILITY: FOOD INSECURITY: WITHIN THE PAST 12 MONTHS, THE FOOD YOU BOUGHT JUST DIDN'T LAST AND YOU DIDN'T HAVE MONEY TO GET MORE.: NEVER TRUE

## 2022-11-22 ASSESSMENT — PATIENT HEALTH QUESTIONNAIRE - PHQ9
SUM OF ALL RESPONSES TO PHQ QUESTIONS 1-9: 0
SUM OF ALL RESPONSES TO PHQ9 QUESTIONS 1 & 2: 0
5. POOR APPETITE OR OVEREATING: 0
8. MOVING OR SPEAKING SO SLOWLY THAT OTHER PEOPLE COULD HAVE NOTICED. OR THE OPPOSITE, BEING SO FIGETY OR RESTLESS THAT YOU HAVE BEEN MOVING AROUND A LOT MORE THAN USUAL: 0
3. TROUBLE FALLING OR STAYING ASLEEP: 0
10. IF YOU CHECKED OFF ANY PROBLEMS, HOW DIFFICULT HAVE THESE PROBLEMS MADE IT FOR YOU TO DO YOUR WORK, TAKE CARE OF THINGS AT HOME, OR GET ALONG WITH OTHER PEOPLE: 0
6. FEELING BAD ABOUT YOURSELF - OR THAT YOU ARE A FAILURE OR HAVE LET YOURSELF OR YOUR FAMILY DOWN: 0
2. FEELING DOWN, DEPRESSED OR HOPELESS: 0
7. TROUBLE CONCENTRATING ON THINGS, SUCH AS READING THE NEWSPAPER OR WATCHING TELEVISION: 0
9. THOUGHTS THAT YOU WOULD BE BETTER OFF DEAD, OR OF HURTING YOURSELF: 0
SUM OF ALL RESPONSES TO PHQ QUESTIONS 1-9: 0
1. LITTLE INTEREST OR PLEASURE IN DOING THINGS: 0
SUM OF ALL RESPONSES TO PHQ QUESTIONS 1-9: 0
4. FEELING TIRED OR HAVING LITTLE ENERGY: 0
SUM OF ALL RESPONSES TO PHQ QUESTIONS 1-9: 0

## 2022-11-22 ASSESSMENT — ENCOUNTER SYMPTOMS
VOMITING: 0
COUGH: 1
VISUAL CHANGE: 0
NAUSEA: 0
ABDOMINAL PAIN: 0
SORE THROAT: 1
CHANGE IN BOWEL HABIT: 1
SWOLLEN GLANDS: 0

## 2022-11-22 ASSESSMENT — SOCIAL DETERMINANTS OF HEALTH (SDOH): HOW HARD IS IT FOR YOU TO PAY FOR THE VERY BASICS LIKE FOOD, HOUSING, MEDICAL CARE, AND HEATING?: NOT HARD AT ALL

## 2022-11-22 NOTE — LETTER
[facility]  [address] [address]  Phone: [phone]  Fax: [phone]    ZACH Easley CNP        November 22, 2022     Patient: Vincent Robles   YOB: 1990   Date of Visit: 11/22/2022       To Whom it May Concern:    Caron Becerra was seen in my clinic on 11/22/2022. She may return to work on when covid results are back in 1-3 days, pending negative result. .    If you have any questions or concerns, please don't hesitate to call.     Sincerely,         ZACH Easley CNP

## 2022-11-22 NOTE — PROGRESS NOTES
[address] [facility] [address]  Dept: [phone]  Dept Fax: [phone]    Ledy Shafer is a 34 y.o. female who presents to the urgent care today for her medical conditions/complaints as notedbelow. Ledy Shafer is c/o of Pharyngitis (Hurts to swallow), Headache, and Congestion (This has been going on for about 4 days. Today is day 4. )      HPI:     34 yr old female presents for covid test  C/o st, intermittent HA, head congestion for 4 days. Covid Vaccinated? No  No fevers. Ears hurt. ,     .    Pharyngitis  This is a new problem. The current episode started in the past 7 days (x4d). The problem occurs constantly. The problem has been gradually worsening. Associated symptoms include a change in bowel habit, chills, congestion, coughing, fatigue, headaches and a sore throat. Pertinent negatives include no abdominal pain, anorexia, arthralgias, chest pain, diaphoresis, fever, joint swelling, myalgias, nausea, neck pain, numbness, rash, swollen glands, urinary symptoms, vertigo, visual change, vomiting or weakness. The symptoms are aggravated by swallowing. She has tried acetaminophen (vicks) for the symptoms. The treatment provided mild relief.      Past Medical History:   Diagnosis Date    Bipolar disorder ([facility] 7/22/2014    Gall stones     Psychiatric problem     Seizure ([facility] 9/17/2014    Substance abuse ([facility])         Current Outpatient Medications   Medication Sig Dispense Refill    Prenatal Vit-Fe Fumarate-FA (PRENATAL COMPLETE) 14-0.4 MG TABS Take 1 capsule by mouth daily      cetirizine (ZYRTEC) 10 MG tablet TAKE ONE TABLET BY MOUTH ONCE A DAY      fluticasone (FLONASE) 50 MCG/ACT nasal spray 2 sprays by Nasal route daily 16 g 0    cetirizine (ZYRTEC) 10 MG tablet Take 1 tablet by mouth daily 30 tablet 0    amoxicillin-clavulanate (AUGMENTIN) 875-125 MG per tablet Take 1 tablet by mouth 2 times daily for 10 days 20 tablet 0    benzonatate (TESSALON PERLES) 100 MG capsule Take 1 capsule by mouth 3 times daily as needed for Cough 21 capsule 0    ibuprofen (ADVIL;MOTRIN) 800 MG tablet Take 1 tablet by mouth every 8 hours as needed for Pain 30 tablet 0    acetaminophen (TYLENOL) 500 MG tablet Take 1 tablet by mouth every 6 hours as needed for Pain 120 tablet 3    cyclobenzaprine (FLEXERIL) 10 MG tablet Take 1 tablet by mouth nightly as needed for Muscle spasms (Patient not taking: Reported on 11/22/2022) 30 tablet 0    lidocaine (LMX) 4 % cream Apply topically 1-2 times/day as needed. (Patient not taking: No sig reported) 45 g 3     No current facility-administered medications for this visit. Allergies   Allergen Reactions    Haldol [Haloperidol Lactate] Other (See Comments)     Acute Dystonia       Subjective:      Review of Systems   Constitutional:  Positive for chills and fatigue. Negative for diaphoresis and fever. HENT:  Positive for congestion and sore throat. Respiratory:  Positive for cough. Cardiovascular:  Negative for chest pain. Gastrointestinal:  Positive for change in bowel habit. Negative for abdominal pain, anorexia, nausea and vomiting. Musculoskeletal:  Negative for arthralgias, joint swelling, myalgias and neck pain. Skin:  Negative for rash. Neurological:  Positive for headaches. Negative for vertigo, weakness and numbness. All other systems reviewed and are negative. 14 systems reviewed and negative except as listed in HPI. Objective:     Physical Exam  Vitals and nursing note reviewed. Constitutional:       General: She is not in acute distress. Appearance: Normal appearance. She is well-developed. She is not ill-appearing, toxic-appearing or diaphoretic. Comments: nontoxic   HENT:      Head: Normocephalic and atraumatic.       Right Ear: Ear canal and external ear normal.      Left Ear: Tympanic membrane, ear canal and external ear normal.      Ears:      Comments: Rt tm bulging and injected     Nose: Congestion and rhinorrhea present. Comments: Rt maxillary sinus tenderness     Mouth/Throat:      Mouth: Mucous membranes are moist.      Pharynx: Oropharynx is clear. No oropharyngeal exudate or posterior oropharyngeal erythema. Comments: Swallows without difficulty  Thin white PND  Eyes:      General: No scleral icterus. Right eye: No discharge. Left eye: No discharge. Conjunctiva/sclera: Conjunctivae normal.      Pupils: Pupils are equal, round, and reactive to light. Cardiovascular:      Rate and Rhythm: Normal rate and regular rhythm. Pulses: Normal pulses. Heart sounds: Normal heart sounds. Pulmonary:      Effort: Pulmonary effort is normal. No respiratory distress. Breath sounds: Normal breath sounds. No stridor. No wheezing, rhonchi or rales. Chest:      Chest wall: No tenderness. Abdominal:      General: Bowel sounds are normal. There is no distension. Palpations: Abdomen is soft. Tenderness: There is no abdominal tenderness. Musculoskeletal:         General: No tenderness or deformity. Normal range of motion. Cervical back: Normal range of motion and neck supple. Lymphadenopathy:      Cervical: No cervical adenopathy. Skin:     General: Skin is warm and dry. Findings: No rash ( no rash to visible skin). Neurological:      General: No focal deficit present. Mental Status: She is alert and oriented to person, place, and time. Motor: No abnormal muscle tone. Coordination: Coordination normal.   Psychiatric:         Mood and Affect: Mood normal.         Behavior: Behavior normal.     /84 (Site: Left Upper Arm, Position: Sitting, Cuff Size: Medium Adult)   Pulse (!) 115   Temp 97.5 °F (36.4 °C) (Temporal)   Ht 5' 3\" (1.6 m)   Wt 125 lb (56.7 kg)   SpO2 98%   BMI 22.14 kg/m²     Assessment:       Diagnosis Orders   1.  Non-recurrent acute suppurative otitis media of right ear without spontaneous rupture of tympanic membrane        2. Symptoms of upper respiratory infection (URI)  COVID-19          Plan:      Interested in paxlovid if + covid  Reviewed over-the-counter treatments for symptom management. Flonase rx  Zyrtec rx  Aug rx  Holly perles rx  Will send out COVID19 testing. Possible treatment alterations based on the results. Patient instructed to self-quarantine until testing results are back. Patient instructed not to return to work until results are back. Tylenol as needed for fever/pain. Encouraged adequate hydration and rest.  The patient indicates understanding of these issues and agrees with the plan. Educational materials provided on AVS.  Follow up if symptoms do not improve/worsen. Discussed symptoms that will warrant urgent ED evaluation/treatment. Return for make appt with Family Doc in 2 weeks for ear check. Orders Placed This Encounter   Medications    fluticasone (FLONASE) 50 MCG/ACT nasal spray     Si sprays by Nasal route daily     Dispense:  16 g     Refill:  0    cetirizine (ZYRTEC) 10 MG tablet     Sig: Take 1 tablet by mouth daily     Dispense:  30 tablet     Refill:  0    amoxicillin-clavulanate (AUGMENTIN) 875-125 MG per tablet     Sig: Take 1 tablet by mouth 2 times daily for 10 days     Dispense:  20 tablet     Refill:  0    benzonatate (TESSALON PERLES) 100 MG capsule     Sig: Take 1 capsule by mouth 3 times daily as needed for Cough     Dispense:  21 capsule     Refill:  0         Patient given educational materials - see patient instructions. Discussed use, benefit, and side effects of prescribed medications. All patient questions answered. Pt voicedunderstanding.     Electronically signed by ZACH Wheeler CNP on 2022 at 12:00 PM

## 2022-11-22 NOTE — PATIENT INSTRUCTIONS
The COVID-19 test that was done today can take 1-6 days for results. Until then you should assume you have this disease and adhere to home isolation as described below. When we get the test results back, one of the following readings will be obtained. A positive test means you have the virus. 2.  An inconclusive test means it wasn't sure if you have the virus or not. An inconclusive test result is treated as a positive result and recommendations  are the same as a positive test result. We may ask you to repeat this test in this circumstance. 3.  A negative test means you probably do not have the virus, but it is not conclusive. If your results of the COVID-19 test is NEGATIVE -     The patient may stop isolation, in consultation with your health care provider, typically when, your healthcare provider has determined that the cause of the illness is NOT COVID-19 and approves your return to work. Please follow up with your physician for evaluation about this. If your results of the COVID-19 test is POSITIVE- you must isolate yourself from others. Isolation:    Isolation is used to separate people with confirmed or suspected COVID-19 from those without COVID-19. People who are in isolation should stay home until its safe for them to be around others. At home, anyone sick or infected should separate from others, or wear a well-fitting mask when they need to be around others. People in isolation should stay in a specific sick room or area and use a separate bathroom if available. Everyone who has presumed or confirmed COVID-19 should stay home and isolate from other people for at least 5 full days (day 0 is the first day of symptoms or the date of the day of the positive viral test for asymptomatic persons). They should wear a well-fitting mask when around others at home and in public for an additional 5 days.  People who are confirmed to have COVID-19 or are showing symptoms of COVID-19 need to isolate regardless of their vaccination status. This includes:     People who have a positive viral test for COVID-19, regardless of whether or not they have symptoms. 2.  People with symptoms of COVID-19, including people who are awaiting test results or have not been tested. People with symptoms should isolate even if they do not know if they have been in close contact with someone with COVID-19. Scenario 1:    Ending isolation for people who had COVID-19 and had symptoms  If you had COVID-19 and had symptoms, isolate for at least 5 days. To calculate your 5-day isolation period, day 0 is your first day of symptoms. Day 1 is the first full day after your symptoms developed. You can leave isolation after 5 full days. You can end isolation after 5 full days if you are fever-free for 24 hours without the use of fever-reducing medication and your other symptoms have improved (Loss of taste and smell may persist for weeks or months after recovery and need not delay the end of isolation). You should continue to wear a well-fitting mask around others at home and in public for 5 additional days (day 6 through day 10) after the end of your 5-day isolation period. If you are unable to wear a mask when around others, you should continue to isolate for a full 10 days. Avoid people who are immunocompromised or at high risk for severe disease, and nursing homes and other high-risk settings, until after at least 10 days. -Do not go to places where you are unable to wear a mask, such as restaurants and some gyms, and avoid eating around others at home and at work until after 10 days   If you continue to have fever or your other symptoms have not improved after 5 days of isolation, you should wait to end your isolation until you are fever-free for 24 hours without the use of fever-reducing medication and your other symptoms have improved. Continue to wear a well-fitting mask.  Contact your healthcare provider if you have questions. Do not travel during your 5-day isolation period. After you end isolation, avoid travel until a full 10 days after your first day of symptoms. If you must travel on days 6-10, wear a well-fitting mask when you are around others for the entire duration of travel. If you are unable to wear a mask, you should not travel during the 10 days. Do not go to places where you are unable to wear a mask, such as restaurants and some gyms, and avoid eating around others at home and at work until a full 10 days after your first day of symptoms. If an individual has access to a test and wants to test, the best approach is to use an antigen test (see comment below) towards the end of the 5-day isolation period. Collect the test sample only if you are fever-free for 24 hours without the use of fever-reducing medication and your other symptoms have improved (loss of taste and smell may persist for weeks or months after recovery and need not delay the end of isolation). If your test result is positive, you should continue to isolate until day 10. If your test result is negative,  you can end isolation, but continue to wear a well-fitting mask around others at home and in public until day 10. Follow additional recommendations for masking and restricting travel as described above. Note that these recommendations on ending isolation do not apply to people with severe COVID-19 or with weakened immune systems (immunocompromised). See section below for recommendations for when to end isolation for these groups. Comment - Negative results should be treated as presumptive. Negative results do not rule out SARS-CoV-2 infection and should not be used as the sole basis for treatment or patient management decisions, including infection control decisions.  To improve results, antigen tests should be used twice over a three-day period with at least 24 hours and no more than 48 hours between tests.        Scenario 2:    Ending isolation for people who tested positive for COVID-19 but had no symptoms  If you test positive for COVID-19 and never develop symptoms, isolate for at least 5 days. Day 0 is the day of your positive viral test (based on the date you were tested) and day 1 is the first full day after the specimen was collected for your positive test. You can leave isolation after 5 full days. If you continue to have no symptoms, you can end isolation after at least 5 days. You should continue to wear a well-fitting mask around others at home and in public until day 10 (day 6 through day 10). If you are unable to wear a well-fitting mask when around others, you should continue to isolate for 10 days. Avoid people who are immunocompromised or at high risk for severe disease, and nursing homes and other high-risk settings, until after at least 10 days. -Do not go to places where you are unable to wear a mask, such as restaurants and some gyms, and avoid eating around others at home and at work until after 10 days   If you develop symptoms after testing positive, your 5-day isolation period should start over. Day 0 is your first day of symptoms. Follow the recommendations above for ending isolation for people who had COVID-19 and had symptoms. Do not travel during your 5-day isolation period. After you end isolation, avoid travel until 10 days after the day of your positive test. If you must travel on days 6-10, wear a well-fitting mask when you are around others for the entire duration of travel.  If you are unable to wear a mask, you should not travel during the 10 days after your positive test.  Do not go to places where you are unable to wear a mask, such as restaurants and some gyms, and avoid eating around others at home and at work until 10 days after the day of your positive test.  If an individual has access to a test and wants to test, the best approach is to use an antigen test (see comment below) towards the end of the 5-day isolation period. If your test result is positive, you should continue to isolate until day 10. If your test result is negative, you can end isolation, but continue to wear a well-fitting mask around others at home and in public until day 10. Follow additional recommendations for masking and restricting travel described above. Comment-Negative results should be treated as presumptive. Negative results do not rule out SARS-CoV-2 infection and should not be used as the sole basis for treatment or patient management decisions, including infection control decisions. To improve results, antigen tests should be used twice over a three-day period with at least 24 hours and no more than 48 hours between tests. Scenario 3:    Ending isolation for people who were severely ill with COVID-19 or have a weakened immune system (immunocompromised)  People who are severely ill with COVID-19 (including those who were hospitalized or required intensive care or ventilation support) and people with compromised immune systems might need to isolate at home longer. They may also require testing with a viral test to determine when they can be around others. CDC recommends an isolation period of at least 10 and up to 20 days for people who were severely ill with COVID-19 and for people with weakened immune systems. Consult with your healthcare provider about when you can resume being around other people. People who are immunocompromised should talk to their healthcare provider about the potential for reduced immune responses to COVID-19 vaccines and the need to continue to follow? current prevention measures? (including wearing a well-fitting mask, staying 6 feet apart from others they dont live with, and avoiding crowds and poorly ventilated indoor spaces) to protect themselves against COVID-19 until advised otherwise by their healthcare provider.  Close contacts of immunocompromised people - including household members - should also be encouraged to receive all recommended COVID-19 vaccine doses to help protect these people. COVID-19 EXPOSURE    Close Contact Definition:    Someone who was less than 6 feet away from an infected person (laboratory-confirmed or a clinical diagnosis) for a cumulative total of 15 minutes or more over a 24-hour period (for example, three individual 5-minute exposures for a total of 15 minutes). Scenario 4:    Who should quarantine after COVID-19 exposure? If you come into close contact with someone with COVID-19, you should quarantine if you are in one of the following groups:      After Being Exposed to [place]  Immediately  Wear a mask as soon as you find out you were exposed  Start counting from Day 1    Day 0 is the day of your last exposure to someone with COVID-19  Day 1 is the first full day after your last exposure  CONTINUE PRECAUTIONS  10 Full Days  You can still develop COVID-19 up to 10 days after you have been exposed  Take Precautions  Wear a high-quality mask or respirator (e.g., N95) any time you are around others inside your home or indoors in public 1    Do not go places where you are unable to wear a mask. For travel guidance, see CDCs Travel webpage. Take extra precautions if you will be around people who are more likely to get very sick from COVID-19. Watch for symptoms  fever (100.4°F or greater)  cough  shortness of breath  other COVID-19 symptoms  If you develop symptoms    isolate immediately  get tested  stay home until you know the result  If your test result is positive, follow the isolation recommendations. GET TESTED  Day 6  Get tested at least 5 full days after your last exposure  Test even if you dont develop symptoms. If you already had COVID-19 within the past 90 days, see specific testing recommendations.     IF YOU TEST  Negative  Continue taking precautions through day 10    Wear a high-quality mask when around others at home and indoors in [address] [place] [address] [place] can still develop COVID-19 up to 10 days after you have been exposed. IF YOU TEST  Positive  Isolate immediately  1. You are ages 25 or older and completed the primary series of recommended vaccine, but have not received a recommended booster shot when eligible. 2.You received the single-dose [employer] vaccine (completing the primary series) over 2 months ago and have not received a recommended booster shot. 3.You are not vaccinated or have not completed a primary vaccine series. --If you test negative, you can leave your home, but continue to wear a well-fitting mask when around others at home and in public until 10 days after your last close contact ith   someone with COVID-19.  -If you test positive, you should isolate for at least 5 days from the date of your positive test (if you do not have symptoms). If you do develop COVID-19 symptoms, isolate for at least 5 days from the date your symptoms began (the date the symptoms started is day 0). Follow recommendations in the isolation section below.  -If you are unable to get a test 5 days after last close contact with someone with COVID-19, you can leave your home after day 5 if you have been without COVID-19 symptoms throughout the 5-day period. Wear a well-fitting mask for 10 days after your date of last close contact when around others at home and in public.  -Avoid people who are immunocompromised or at high risk for severe disease, and nursing homes and other high-risk settings, until after at least 10 days.   -If possible, stay away from people you live with, especially people who are at higher risk for getting very sick from COVID-19, as well as others outside your home throughout the full 10 days after your last close contact with someone with COVID-19.  -If you are unable to quarantine, you should wear a well-fitting mask for 10 days when around others at home and in public.  -Do not travel during your 5-day quarantine period. Get tested at least 5 days after your last close contact and make sure your test result is negative and you remain without symptoms before traveling. If you dont get tested, delay travel until 10 days after your last close contact with a person with COVID-19. If you must travel before the 10 days are completed, wear a well-fitting mask when you are around others for the entire duration of travel during the 10 days. If you are unable to wear a mask, you should not travel during the 10 days.  -Do not go to places where you are unable to wear a mask, such as restaurants and some gyms, and avoid eating around others at home and at work until after 10 days after your last close contact with someone with COVID-19. Prevention steps for People with positive or inconclusive test results or suspected  COVID-19 (including persons under investigation) who do not need to be hospitalized  and   People with confirmed COVID-19 who were hospitalized and determined to be medically stable to go home      Your healthcare provider and public health staff will evaluate whether you can be cared for at home. If it is determined that you do not need to be hospitalized and can be isolated at home, you will be monitored by staff from your health department. You should follow the prevention steps below until a healthcare provider or local or state health department says you can return to your normal activities. Stay home except to get medical care  People who are mildly ill with COVID-19 are able to isolate at home during their illness. You should restrict activities outside your home, except for getting medical care. Do not go to work, school, or public areas. Avoid using public transportation, ride-sharing, or taxis.   Separate yourself from other people and animals in your home  People: As much as possible, you should stay in a specific room (sick room) and away from other people in your home. Also, you should use a separate bathroom, if available. Animals: You should restrict contact with pets and other animals while you are sick with COVID-19, just like you would around other people. When possible, have another member of your household care for your animals while you are sick. If you are sick with COVID-19, avoid contact with your pet, including petting, snuggling, being kissed or licked, and sharing food. If you must care for your pet or be around animals while you are sick, wash your hands before and after you interact with pets and wear a facemask. Wear a facemask  You should wear a well-fitting facemask when you are around other people (as should the people around you) or pets and before you enter a healthcare providers office. Clean your hands often  Wash your hands often with soap and water for at least 20 seconds, especially after blowing your nose, coughing, or sneezing; going to the bathroom; and before eating or preparing food. If soap and water are not readily available, use an alcohol-based hand  with at least 60% alcohol, covering all surfaces of your hands and rubbing them together until they feel dry. Soap and water are the best option if hands are visibly dirty. Avoid touching your eyes, nose, and mouth with unwashed hands. Avoid sharing personal household items  You should not share dishes, drinking glasses, cups, eating utensils, towels, or bedding with other people or pets in your home. After using these items, they should be washed thoroughly with soap and water. Monitor your symptoms  Seek prompt medical attention if your illness is worsening (e.g., difficulty breathing). Before seeking care, call your healthcare provider and tell them that you have, or are being evaluated for, COVID-19. Put on a facemask before you enter the facility.  These steps will help the healthcare providers office to keep other people in the office or waiting room from getting infected or exposed. Persons who are placed under active monitoring or facilitated self-monitoring should follow instructions provided by their local health department or occupational health professionals, as appropriate. When working with your local health department check their available hours. If you have a medical emergency and need to call 911, notify the dispatch personnel that you have, or are being evaluated for COVID-19. If possible, put on a facemask before emergency medical services arrive. Treatments are under investigation for outpatients and can be considered for patients with mild to moderate COVID-19 who are not on supplemental oxygen and are not hospitalized but who are at [address] [place] [address] for clinical progression have had onset of symptoms within the past 3-10 days. HIGH RISK is defined as patients who meet at least one of the following criteria:    Have a body mass index (BMI) ? 35    Have chronic kidney disease    Have diabetes    Have immunosuppressive disease    Are currently receiving immunosuppressive treatment    Are ?72years of age    Are ?54years of age AND have  *cardiovascular disease, OR  *hypertension, OR  *chronic obstructive pulmonary disease/other chronic respiratory disease. Are 15- 16years of age AND have  *BMI ? 85th percentile for their age and gender based on CDC growth charts, [email or web address] , OR  *sickle cell disease, OR  *congenital or acquired heart disease, OR  *neurodevelopmental disorders, for example, cerebral palsy, OR  *a medical-related technological dependence, for example, tracheostomy, gastrostomy, or positive pressure ventilation (not related to   COVID-19), OR  *asthma, reactive airway or other chronic respiratory disease that requires daily medication for control. Other risk factors:   Moderate/severe dementia  Cancer being treated with palliative care  Cirrhosis  Pregnancy  Breast feeding  Weight less than 40Kg (88lbs)        WELL FITTING MASK      Cloth Mask    Cloth Masks can be made from a variety of fabrics and many types of cloth masks are available. Wear cloth masks with  A proper fit over your nose and mouth to prevent leaks  Multiple layers of tightly woven, breathable fabric  Nose wire  Fabric that blocks light when held up to bright light source    Do NOT wear cloth masks with  Gaps around the sides of the face or nose  Exhalation valves, vents, or other openings (see example)  Single-layer fabric or those made of thin fabric that dont block light      Disposable Masks    Disposable face masks are widely available. They are sometimes referred to as surgical masks or medical procedure masks. Wear disposable masks with  A proper fit over your nose and mouth to prevent leaks  Multiple layers of non-woven material  Nose wire    Do NOT wear disposable masks with  Gaps around the sides of the face or nose (see example)  Wet or dirty material    Ways to have better fit and extra protection with cloth and disposable masks  Wear two masks (disposable mask underneath AND cloth mask on top)  Combine either a cloth mask or disposable mask with a fitter or brace  Knot and tuck ear loops of a 3-ply mask where they join the edge of the mask  For disposable masks, fold and tuck the unneeded material under the edges. (For instructions, see the following https://youtu. be/[email or web address])  Use masks that attach behind the neck and head with either elastic bands or ties (instead of ear loops)

## 2022-11-23 LAB
SARS-COV-2: NORMAL
SARS-COV-2: NOT DETECTED
SOURCE: NORMAL

## 2022-11-25 ENCOUNTER — TELEPHONE (OUTPATIENT)
Dept: FAMILY MEDICINE CLINIC | Age: 32
End: 2022-11-25

## 2022-11-25 NOTE — TELEPHONE ENCOUNTER
Patient called in stating she needs a note starting it is ok for her to return to work with COVID results.

## 2022-12-20 RX ORDER — FLUTICASONE PROPIONATE 50 MCG
SPRAY, SUSPENSION (ML) NASAL
Qty: 16 G | Refills: 0 | OUTPATIENT
Start: 2022-12-20

## 2023-06-30 ENCOUNTER — HOSPITAL ENCOUNTER (OUTPATIENT)
Age: 33
Setting detail: SPECIMEN
Discharge: HOME | End: 2023-06-30

## 2023-06-30 ENCOUNTER — OFFICE VISIT (OUTPATIENT)
Dept: FAMILY MEDICINE CLINIC | Age: 33
End: 2023-06-30
Payer: MEDICAID

## 2023-06-30 VITALS
HEART RATE: 92 BPM | OXYGEN SATURATION: 98 % | SYSTOLIC BLOOD PRESSURE: 121 MMHG | TEMPERATURE: 97.6 F | DIASTOLIC BLOOD PRESSURE: 81 MMHG

## 2023-06-30 DIAGNOSIS — J06.9 ACUTE URI: Primary | ICD-10-CM

## 2023-06-30 DIAGNOSIS — J02.9 SORE THROAT: ICD-10-CM

## 2023-06-30 DIAGNOSIS — R05.1 ACUTE COUGH: ICD-10-CM

## 2023-06-30 DIAGNOSIS — J06.9 ACUTE URI: ICD-10-CM

## 2023-06-30 DIAGNOSIS — R07.89 CHEST TIGHTNESS: ICD-10-CM

## 2023-06-30 PROBLEM — R10.2 PELVIC PRESSURE IN PREGNANCY: Status: ACTIVE | Noted: 2022-03-29

## 2023-06-30 PROBLEM — O26.899 PELVIC PRESSURE IN PREGNANCY: Status: ACTIVE | Noted: 2022-03-29

## 2023-06-30 LAB — S PYO AG THROAT QL: NORMAL

## 2023-06-30 PROCEDURE — 99213 OFFICE O/P EST LOW 20 MIN: CPT | Performed by: NURSE PRACTITIONER

## 2023-06-30 PROCEDURE — 87880 STREP A ASSAY W/OPTIC: CPT | Performed by: NURSE PRACTITIONER

## 2023-06-30 RX ORDER — PREDNISONE 20 MG/1
20 TABLET ORAL 2 TIMES DAILY
Qty: 10 TABLET | Refills: 0 | Status: SHIPPED | OUTPATIENT
Start: 2023-06-30 | End: 2023-07-05

## 2023-06-30 RX ORDER — AZITHROMYCIN 250 MG/1
TABLET, FILM COATED ORAL
Qty: 1 PACKET | Refills: 0 | Status: SHIPPED | OUTPATIENT
Start: 2023-06-30 | End: 2023-07-10

## 2023-06-30 RX ORDER — BENZONATATE 100 MG/1
100 CAPSULE ORAL 3 TIMES DAILY PRN
Qty: 30 CAPSULE | Refills: 0 | Status: SHIPPED | OUTPATIENT
Start: 2023-06-30 | End: 2023-07-10

## 2023-06-30 ASSESSMENT — ENCOUNTER SYMPTOMS
EYE PAIN: 0
SHORTNESS OF BREATH: 0
VOICE CHANGE: 0
CHEST TIGHTNESS: 1
VOMITING: 0
TROUBLE SWALLOWING: 0
COUGH: 1
SORE THROAT: 1
NAUSEA: 0
RHINORRHEA: 1

## 2023-07-01 LAB
SARS-COV-2 RNA RESP QL NAA+PROBE: NORMAL
SARS-COV-2 RNA RESP QL NAA+PROBE: NOT DETECTED
SOURCE: NORMAL

## 2023-07-17 ENCOUNTER — OFFICE VISIT (OUTPATIENT)
Dept: GASTROENTEROLOGY | Age: 33
End: 2023-07-17
Payer: MEDICAID

## 2023-07-17 VITALS
HEIGHT: 63 IN | WEIGHT: 139.6 LBS | DIASTOLIC BLOOD PRESSURE: 86 MMHG | SYSTOLIC BLOOD PRESSURE: 122 MMHG | BODY MASS INDEX: 24.73 KG/M2

## 2023-07-17 DIAGNOSIS — B19.20 HEPATITIS C VIRUS INFECTION WITHOUT HEPATIC COMA, UNSPECIFIED CHRONICITY: Primary | ICD-10-CM

## 2023-07-17 PROCEDURE — 99214 OFFICE O/P EST MOD 30 MIN: CPT | Performed by: INTERNAL MEDICINE

## 2023-07-17 ASSESSMENT — ENCOUNTER SYMPTOMS
EYES NEGATIVE: 1
RESPIRATORY NEGATIVE: 1
ABDOMINAL PAIN: 0
ALLERGIC/IMMUNOLOGIC NEGATIVE: 1
BACK PAIN: 1

## 2023-11-06 ENCOUNTER — OFFICE VISIT (OUTPATIENT)
Dept: GASTROENTEROLOGY | Age: 33
End: 2023-11-06
Payer: MEDICAID

## 2023-11-06 VITALS
HEART RATE: 99 BPM | BODY MASS INDEX: 25.54 KG/M2 | DIASTOLIC BLOOD PRESSURE: 89 MMHG | SYSTOLIC BLOOD PRESSURE: 139 MMHG | HEIGHT: 63 IN | WEIGHT: 144.13 LBS

## 2023-11-06 DIAGNOSIS — R76.8 HEPATITIS C ANTIBODY TEST POSITIVE: Primary | ICD-10-CM

## 2023-11-06 PROCEDURE — 99213 OFFICE O/P EST LOW 20 MIN: CPT | Performed by: INTERNAL MEDICINE

## 2023-11-06 ASSESSMENT — ENCOUNTER SYMPTOMS
EYES NEGATIVE: 1
RESPIRATORY NEGATIVE: 1
ALLERGIC/IMMUNOLOGIC NEGATIVE: 1
BACK PAIN: 1
ABDOMINAL PAIN: 0

## 2025-04-21 ENCOUNTER — HOSPITAL ENCOUNTER (OUTPATIENT)
Age: 35
Setting detail: SPECIMEN
Discharge: HOME | End: 2025-04-21

## 2025-04-21 ENCOUNTER — OFFICE VISIT (OUTPATIENT)
Dept: FAMILY MEDICINE CLINIC | Age: 35
End: 2025-04-21
Payer: MEDICAID

## 2025-04-21 VITALS
HEART RATE: 74 BPM | WEIGHT: 127 LBS | HEIGHT: 63 IN | OXYGEN SATURATION: 98 % | DIASTOLIC BLOOD PRESSURE: 70 MMHG | BODY MASS INDEX: 22.5 KG/M2 | SYSTOLIC BLOOD PRESSURE: 110 MMHG

## 2025-04-21 DIAGNOSIS — R63.4 WEIGHT LOSS: ICD-10-CM

## 2025-04-21 DIAGNOSIS — B18.2 HEP C W/O COMA, CHRONIC (HCC): Primary | ICD-10-CM

## 2025-04-21 DIAGNOSIS — F10.90 HEAVY ALCOHOL CONSUMPTION: ICD-10-CM

## 2025-04-21 DIAGNOSIS — F31.9 BIPOLAR 1 DISORDER (HCC): ICD-10-CM

## 2025-04-21 DIAGNOSIS — N89.8 VAGINAL DISCHARGE: ICD-10-CM

## 2025-04-21 DIAGNOSIS — R53.82 CHRONIC FATIGUE: ICD-10-CM

## 2025-04-21 DIAGNOSIS — Z23 ENCOUNTER FOR IMMUNIZATION: ICD-10-CM

## 2025-04-21 DIAGNOSIS — F12.20 MARIHUANA DEPENDENCE (HCC): ICD-10-CM

## 2025-04-21 PROBLEM — O26.899 PELVIC PRESSURE IN PREGNANCY: Status: RESOLVED | Noted: 2022-03-29 | Resolved: 2025-04-21

## 2025-04-21 PROBLEM — R10.2 PELVIC PRESSURE IN PREGNANCY: Status: RESOLVED | Noted: 2022-03-29 | Resolved: 2025-04-21

## 2025-04-21 PROCEDURE — 90677 PCV20 VACCINE IM: CPT | Performed by: FAMILY MEDICINE

## 2025-04-21 PROCEDURE — 99205 OFFICE O/P NEW HI 60 MIN: CPT | Performed by: FAMILY MEDICINE

## 2025-04-21 PROCEDURE — 90471 IMMUNIZATION ADMIN: CPT | Performed by: FAMILY MEDICINE

## 2025-04-21 RX ORDER — SERTRALINE HYDROCHLORIDE 25 MG/1
25 TABLET, FILM COATED ORAL DAILY
Qty: 30 TABLET | Refills: 3 | Status: SHIPPED | OUTPATIENT
Start: 2025-04-21

## 2025-04-21 RX ORDER — ACAMPROSATE CALCIUM 333 MG/1
333 TABLET, DELAYED RELEASE ORAL 3 TIMES DAILY
Qty: 90 TABLET | Refills: 0 | Status: SHIPPED | OUTPATIENT
Start: 2025-04-21

## 2025-04-21 SDOH — ECONOMIC STABILITY: FOOD INSECURITY: WITHIN THE PAST 12 MONTHS, THE FOOD YOU BOUGHT JUST DIDN'T LAST AND YOU DIDN'T HAVE MONEY TO GET MORE.: NEVER TRUE

## 2025-04-21 SDOH — ECONOMIC STABILITY: FOOD INSECURITY: WITHIN THE PAST 12 MONTHS, YOU WORRIED THAT YOUR FOOD WOULD RUN OUT BEFORE YOU GOT MONEY TO BUY MORE.: NEVER TRUE

## 2025-04-21 ASSESSMENT — PATIENT HEALTH QUESTIONNAIRE - PHQ9
2. FEELING DOWN, DEPRESSED OR HOPELESS: NOT AT ALL
SUM OF ALL RESPONSES TO PHQ QUESTIONS 1-9: 0
SUM OF ALL RESPONSES TO PHQ QUESTIONS 1-9: 0
7. TROUBLE CONCENTRATING ON THINGS, SUCH AS READING THE NEWSPAPER OR WATCHING TELEVISION: NOT AT ALL
6. FEELING BAD ABOUT YOURSELF - OR THAT YOU ARE A FAILURE OR HAVE LET YOURSELF OR YOUR FAMILY DOWN: NOT AT ALL
9. THOUGHTS THAT YOU WOULD BE BETTER OFF DEAD, OR OF HURTING YOURSELF: NOT AT ALL
8. MOVING OR SPEAKING SO SLOWLY THAT OTHER PEOPLE COULD HAVE NOTICED. OR THE OPPOSITE, BEING SO FIGETY OR RESTLESS THAT YOU HAVE BEEN MOVING AROUND A LOT MORE THAN USUAL: NOT AT ALL
10. IF YOU CHECKED OFF ANY PROBLEMS, HOW DIFFICULT HAVE THESE PROBLEMS MADE IT FOR YOU TO DO YOUR WORK, TAKE CARE OF THINGS AT HOME, OR GET ALONG WITH OTHER PEOPLE: NOT DIFFICULT AT ALL
SUM OF ALL RESPONSES TO PHQ QUESTIONS 1-9: 0
4. FEELING TIRED OR HAVING LITTLE ENERGY: NOT AT ALL
1. LITTLE INTEREST OR PLEASURE IN DOING THINGS: NOT AT ALL
3. TROUBLE FALLING OR STAYING ASLEEP: NOT AT ALL
SUM OF ALL RESPONSES TO PHQ QUESTIONS 1-9: 0

## 2025-04-21 ASSESSMENT — ENCOUNTER SYMPTOMS
ABDOMINAL PAIN: 1
SHORTNESS OF BREATH: 0
NAUSEA: 1
DIARRHEA: 0
SORE THROAT: 0
COLOR CHANGE: 0
RHINORRHEA: 0
SINUS PRESSURE: 0
BACK PAIN: 0
BLOOD IN STOOL: 0
RECTAL PAIN: 0
STRIDOR: 0
CHEST TIGHTNESS: 0
WHEEZING: 0
TROUBLE SWALLOWING: 0
VOMITING: 0
CONSTIPATION: 0
ABDOMINAL DISTENTION: 1
COUGH: 0
EYE REDNESS: 0

## 2025-04-21 NOTE — PROGRESS NOTES
Well Adult Note  Name: Katelynn De Santiago Today’s Date: 2025   MRN: [id number] Sex: Female   Age: 34 y.o. Ethnicity:  /    : 1990 Race: Other      Katelynn De Santiago is here for a well adult exam.        The patient (or guardian, if applicable) and other individuals in attendance with the patient were advised that Artificial Intelligence will be utilized during this visit to record, process the conversation to generate a clinical note, and support improvement of the AI technology. The patient (or guardian, if applicable) and other individuals in attendance at the appointment consented to the use of AI, including the recording.                  Assessment & Plan  1. Hepatitis C.  - History of hepatitis C with a high viral load, untreated due to insurance issues and lack of follow-up.  - Reports symptoms including abdominal pain, bruising, chronic fatigue, and weight loss, potentially related to chronic liver disease.  - Blood work ordered to assess liver function; ultrasound of the abdomen and liver ordered to check for ascites and liver damage.  - Referral to a gastroenterologist made for further evaluation and management.    2. Alcohol Use Disorder.  - Reports drinking half a pint of alcohol daily since last summer, primarily in the afternoons.  - Advised to reduce and eventually stop alcohol consumption to prevent further liver damage.  - Acamprosate to be prescribed to help with cravings, pending blood work results to ensure liver safety.  - Referral to behavioral therapy made to address underlying issues contributing to alcohol use.    3. Bipolar Disorder.  - History of bipolar disorder, not currently on any mood stabilizers or psychiatric medications.  - Reports increased stress and drinking following her mother's passing.  - Zoloft 25 mg to be started to help stabilize mood and improve overall mental health.  - Referral to behavioral therapy made for further support and

## 2025-04-21 NOTE — PROGRESS NOTES
Visit Information    Have you changed or started any medications since your last visit including any over-the-counter medicines, vitamins, or herbal medicines? no   Are you having any side effects from any of your medications? -  no  Have you stopped taking any of your medications? Is so, why? -  no    Have you seen any other physician or provider since your last visit? No  Have you had any other diagnostic tests since your last visit? No  Have you been seen in the emergency room and/or had an admission to a hospital since we last saw you? No  Have you had your routine dental cleaning in the past 6 months?     Have you activated your [employer] account? If not, what are your barriers? Yes     Patient Care Team:  Remedios Crane MD as PCP - Empaneled Provider  Joaquina Crane MD as Consulting Physician (Gastroenterology)    Medical History Review  Past Medical, Family, and Social History reviewed and does contribute to the patient presenting condition    Health Maintenance   Topic Date Due    Depression Monitoring  Never done    Pneumococcal 0-49 years Vaccine (2 of 2 - PCV) 09/28/2021    COVID-19 Vaccine (1 - 2024-25 season) Never done    Flu vaccine (Season Ended) 08/01/2025    Cervical cancer screen  03/31/2026    DTaP/Tdap/Td vaccine (4 - Td or Tdap) 05/09/2032    Hepatitis A vaccine  Completed    HIV screen  Completed    Hib vaccine  Aged Out    HPV vaccine  Aged Out    Polio vaccine  Aged Out    Meningococcal (ACWY) vaccine  Aged Out    Meningococcal B vaccine  Aged Out    Hepatitis B vaccine  Discontinued    Varicella vaccine  Discontinued

## 2025-04-22 ENCOUNTER — RESULTS FOLLOW-UP (OUTPATIENT)
Dept: FAMILY MEDICINE CLINIC | Age: 35
End: 2025-04-22

## 2025-04-22 DIAGNOSIS — N76.0 BV (BACTERIAL VAGINOSIS): Primary | ICD-10-CM

## 2025-04-22 DIAGNOSIS — B96.89 BV (BACTERIAL VAGINOSIS): Primary | ICD-10-CM

## 2025-04-22 LAB
CANDIDA SPECIES: NEGATIVE
GARDNERELLA VAGINALIS: POSITIVE
SOURCE: ABNORMAL
TRICHOMONAS: NEGATIVE

## 2025-04-22 RX ORDER — METRONIDAZOLE 500 MG/1
500 TABLET ORAL 2 TIMES DAILY
Qty: 14 TABLET | Refills: 0 | Status: SHIPPED | OUTPATIENT
Start: 2025-04-22 | End: 2025-04-29

## 2025-04-23 ENCOUNTER — HOSPITAL ENCOUNTER (OUTPATIENT)
Age: 35
Discharge: HOME | End: 2025-04-23
Payer: MEDICAID

## 2025-04-23 DIAGNOSIS — R53.82 CHRONIC FATIGUE: ICD-10-CM

## 2025-04-23 DIAGNOSIS — N89.8 VAGINAL DISCHARGE: ICD-10-CM

## 2025-04-23 DIAGNOSIS — F10.90 HEAVY ALCOHOL CONSUMPTION: ICD-10-CM

## 2025-04-23 DIAGNOSIS — B18.2 HEP C W/O COMA, CHRONIC (HCC): ICD-10-CM

## 2025-04-23 LAB
25(OH)D3 SERPL-MCNC: 11.7 NG/ML (ref 30–100)
ALBUMIN SERPL-MCNC: 3.7 G/DL (ref 3.5–5.2)
ALP SERPL-CCNC: 170 U/L (ref 35–104)
ALT SERPL-CCNC: 78 U/L (ref 10–35)
ANION GAP SERPL CALCULATED.3IONS-SCNC: 10 MMOL/L (ref 9–16)
AST SERPL-CCNC: 155 U/L (ref 10–35)
BASOPHILS # BLD: 0.05 K/UL (ref 0–0.2)
BASOPHILS NFR BLD: 1 % (ref 0–2)
BILIRUB SERPL-MCNC: 1.5 MG/DL (ref 0–1.2)
BUN SERPL-MCNC: 6 MG/DL (ref 6–20)
CALCIUM SERPL-MCNC: 9.3 MG/DL (ref 8.6–10.4)
CHLORIDE SERPL-SCNC: 103 MMOL/L (ref 98–107)
CHOLEST SERPL-MCNC: 161 MG/DL (ref 0–199)
CHOLESTEROL/HDL RATIO: 4.9
CO2 SERPL-SCNC: 25 MMOL/L (ref 20–31)
CREAT SERPL-MCNC: 0.5 MG/DL (ref 0.7–1.2)
EOSINOPHIL # BLD: 0.05 K/UL (ref 0–0.4)
EOSINOPHILS RELATIVE PERCENT: 1 % (ref 0–4)
ERYTHROCYTE [DISTWIDTH] IN BLOOD BY AUTOMATED COUNT: 19.2 % (ref 11.5–14.9)
FOLATE SERPL-MCNC: 2.9 NG/ML (ref 4.8–24.2)
GFR, ESTIMATED: >90 ML/MIN/1.73M2
GLUCOSE SERPL-MCNC: 117 MG/DL (ref 74–99)
HCT VFR BLD AUTO: 33.5 % (ref 36–46)
HDLC SERPL-MCNC: 33 MG/DL
HGB BLD-MCNC: 11.3 G/DL (ref 12–16)
LDLC SERPL CALC-MCNC: 98 MG/DL (ref 0–100)
LYMPHOCYTES NFR BLD: 1.7 K/UL (ref 1–4.8)
LYMPHOCYTES RELATIVE PERCENT: 32 % (ref 24–44)
MAGNESIUM SERPL-MCNC: 1.7 MG/DL (ref 1.6–2.6)
MCH RBC QN AUTO: 38.6 PG (ref 26–34)
MCHC RBC AUTO-ENTMCNC: 33.8 G/DL (ref 31–37)
MCV RBC AUTO: 114.3 FL (ref 80–100)
MONOCYTES NFR BLD: 0.42 K/UL (ref 0.1–1.3)
MONOCYTES NFR BLD: 8 % (ref 1–7)
MORPHOLOGY: ABNORMAL
NEUTROPHILS NFR BLD: 58 % (ref 36–66)
NEUTS SEG NFR BLD: 3.08 K/UL (ref 1.3–9.1)
PLATELET # BLD AUTO: 268 K/UL (ref 150–450)
PMV BLD AUTO: 7 FL (ref 6–12)
POTASSIUM SERPL-SCNC: 3.9 MMOL/L (ref 3.7–5.3)
PROT SERPL-MCNC: 7.3 G/DL (ref 6.6–8.7)
RBC # BLD AUTO: 2.93 M/UL (ref 4–5.2)
RETICS # AUTO: 0.07 M/UL (ref 0.02–0.1)
RETICS/RBC NFR AUTO: 2.4 % (ref 0.5–2)
SODIUM SERPL-SCNC: 138 MMOL/L (ref 136–145)
TRIGL SERPL-MCNC: 151 MG/DL (ref 0–149)
TSH SERPL DL<=0.05 MIU/L-ACNC: 2.35 UIU/ML (ref 0.27–4.2)
URATE SERPL-MCNC: 3.6 MG/DL (ref 2.4–5.7)
VIT B12 SERPL-MCNC: 701 PG/ML (ref 232–1245)
WBC OTHER # BLD: 5.3 K/UL (ref 3.5–11)

## 2025-04-23 PROCEDURE — 36415 COLL VENOUS BLD VENIPUNCTURE: CPT

## 2025-04-23 PROCEDURE — 82306 VITAMIN D 25 HYDROXY: CPT

## 2025-04-23 PROCEDURE — 84550 ASSAY OF BLOOD/URIC ACID: CPT

## 2025-04-23 PROCEDURE — 83735 ASSAY OF MAGNESIUM: CPT

## 2025-04-23 PROCEDURE — 80061 LIPID PANEL: CPT

## 2025-04-23 PROCEDURE — 82746 ASSAY OF FOLIC ACID SERUM: CPT

## 2025-04-23 PROCEDURE — 80053 COMPREHEN METABOLIC PANEL: CPT

## 2025-04-23 PROCEDURE — 84443 ASSAY THYROID STIM HORMONE: CPT

## 2025-04-23 PROCEDURE — 87491 CHLMYD TRACH DNA AMP PROBE: CPT

## 2025-04-23 PROCEDURE — 85045 AUTOMATED RETICULOCYTE COUNT: CPT

## 2025-04-23 PROCEDURE — 85025 COMPLETE CBC W/AUTO DIFF WBC: CPT

## 2025-04-23 PROCEDURE — 82607 VITAMIN B-12: CPT

## 2025-04-23 PROCEDURE — 87591 N.GONORRHOEAE DNA AMP PROB: CPT

## 2025-04-24 LAB
CHLAMYDIA DNA UR QL NAA+PROBE: NEGATIVE
N GONORRHOEA DNA UR QL NAA+PROBE: NEGATIVE
PATH REV BLD -IMP: NORMAL
SPECIMEN DESCRIPTION: NORMAL
SURGICAL PATHOLOGY REPORT: NORMAL

## 2025-04-25 ENCOUNTER — RESULTS FOLLOW-UP (OUTPATIENT)
Dept: FAMILY MEDICINE CLINIC | Age: 35
End: 2025-04-25

## 2025-04-25 DIAGNOSIS — E55.9 VITAMIN D DEFICIENCY: Primary | ICD-10-CM

## 2025-04-25 DIAGNOSIS — D64.9 ANEMIA, UNSPECIFIED TYPE: ICD-10-CM

## 2025-04-25 DIAGNOSIS — E53.8 FOLIC ACID DEFICIENCY: ICD-10-CM

## 2025-04-25 RX ORDER — FOLIC ACID 1 MG/1
1 TABLET ORAL DAILY
Qty: 90 TABLET | Refills: 1 | Status: SHIPPED | OUTPATIENT
Start: 2025-04-25

## 2025-04-25 RX ORDER — ERGOCALCIFEROL 1.25 MG/1
50000 CAPSULE, LIQUID FILLED ORAL WEEKLY
Qty: 12 CAPSULE | Refills: 1 | Status: SHIPPED | OUTPATIENT
Start: 2025-04-25

## 2025-04-28 ENCOUNTER — RESULTS FOLLOW-UP (OUTPATIENT)
Dept: FAMILY MEDICINE CLINIC | Age: 35
End: 2025-04-28

## 2025-04-28 ENCOUNTER — HOSPITAL ENCOUNTER (OUTPATIENT)
Dept: ULTRASOUND IMAGING | Age: 35
Discharge: HOME | End: 2025-04-30
Payer: MEDICAID

## 2025-04-28 DIAGNOSIS — B18.2 HEP C W/O COMA, CHRONIC (HCC): ICD-10-CM

## 2025-04-28 DIAGNOSIS — F10.90 HEAVY ALCOHOL CONSUMPTION: ICD-10-CM

## 2025-04-28 PROCEDURE — 76705 ECHO EXAM OF ABDOMEN: CPT

## 2025-06-12 ENCOUNTER — OFFICE VISIT (OUTPATIENT)
Dept: GASTROENTEROLOGY | Age: 35
End: 2025-06-12
Payer: MEDICAID

## 2025-06-12 VITALS
HEIGHT: 63 IN | WEIGHT: 129 LBS | HEART RATE: 81 BPM | TEMPERATURE: 97.9 F | SYSTOLIC BLOOD PRESSURE: 122 MMHG | RESPIRATION RATE: 19 BRPM | OXYGEN SATURATION: 99 % | BODY MASS INDEX: 22.86 KG/M2 | DIASTOLIC BLOOD PRESSURE: 89 MMHG

## 2025-06-12 DIAGNOSIS — B18.2 CHRONIC HEPATITIS C WITHOUT HEPATIC COMA (HCC): Primary | ICD-10-CM

## 2025-06-12 DIAGNOSIS — R53.83 OTHER FATIGUE: ICD-10-CM

## 2025-06-12 PROCEDURE — 99214 OFFICE O/P EST MOD 30 MIN: CPT | Performed by: INTERNAL MEDICINE

## 2025-06-12 PROCEDURE — G2211 COMPLEX E/M VISIT ADD ON: HCPCS | Performed by: INTERNAL MEDICINE

## 2025-06-12 ASSESSMENT — ENCOUNTER SYMPTOMS
ANAL BLEEDING: 0
TROUBLE SWALLOWING: 0
CONSTIPATION: 0
RECTAL PAIN: 0
ABDOMINAL PAIN: 1
BLOOD IN STOOL: 0
WHEEZING: 0
DIARRHEA: 0
SORE THROAT: 0
VOICE CHANGE: 0
VOMITING: 0
CHOKING: 0
NAUSEA: 1
COUGH: 0
ABDOMINAL DISTENTION: 1

## 2025-06-18 ENCOUNTER — HOSPITAL ENCOUNTER (OUTPATIENT)
Age: 35
Discharge: HOME | End: 2025-06-18
Payer: MEDICAID

## 2025-06-18 DIAGNOSIS — B18.2 CHRONIC HEPATITIS C WITHOUT HEPATIC COMA (HCC): ICD-10-CM

## 2025-06-18 LAB
HAV IGM SERPL QL IA: NONREACTIVE
HBV CORE AB SER QL: NONREACTIVE
HBV CORE IGM SERPL QL IA: NONREACTIVE
HBV SURFACE AB SERPL IA-ACNC: 12.5 MIU/ML
HBV SURFACE AG SERPL QL IA: NONREACTIVE
HCV AB SERPL QL IA: REACTIVE
HCV GENTYP SERPL NAA+PROBE: NORMAL
HIV 1+2 AB+HIV1 P24 AG SERPL QL IA: NONREACTIVE

## 2025-06-18 PROCEDURE — 86709 HEPATITIS A IGM ANTIBODY: CPT

## 2025-06-18 PROCEDURE — 84460 ALANINE AMINO (ALT) (SGPT): CPT

## 2025-06-18 PROCEDURE — 36415 COLL VENOUS BLD VENIPUNCTURE: CPT

## 2025-06-18 PROCEDURE — 86705 HEP B CORE ANTIBODY IGM: CPT

## 2025-06-18 PROCEDURE — 86317 IMMUNOASSAY INFECTIOUS AGENT: CPT

## 2025-06-18 PROCEDURE — 87350 HEPATITIS BE AG IA: CPT

## 2025-06-18 PROCEDURE — 82977 ASSAY OF GGT: CPT

## 2025-06-18 PROCEDURE — 84450 TRANSFERASE (AST) (SGOT): CPT

## 2025-06-18 PROCEDURE — 86803 HEPATITIS C AB TEST: CPT

## 2025-06-18 PROCEDURE — 87522 HEPATITIS C REVRS TRNSCRPJ: CPT

## 2025-06-18 PROCEDURE — 87902 NFCT AGT GNTYP ALYS HEP C: CPT

## 2025-06-18 PROCEDURE — 87340 HEPATITIS B SURFACE AG IA: CPT

## 2025-06-18 PROCEDURE — 86704 HEP B CORE ANTIBODY TOTAL: CPT

## 2025-06-18 PROCEDURE — 83883 ASSAY NEPHELOMETRY NOT SPEC: CPT

## 2025-06-18 PROCEDURE — 87389 HIV-1 AG W/HIV-1&-2 AB AG IA: CPT

## 2025-06-18 PROCEDURE — 84520 ASSAY OF UREA NITROGEN: CPT

## 2025-06-20 LAB
HCV RNA # SERPL NAA+PROBE: DETECTED {COPIES}/ML
HCV RNA SERPL NAA+PROBE-ACNC: ABNORMAL IU/ML
HCV RNA SERPL NAA+PROBE-LOG IU: 7.38 LOG IU/ML
SPECIMEN SOURCE: ABNORMAL

## 2025-06-21 LAB
ALANINE AMINOTRANSFERASE, FIBROMETER: 172 U/L (ref 5–40)
ALPHA-2-MACROGLOBULIN, FIBROMETER: 217 MG/DL (ref 131–293)
ASPARTATE AMINOTRANSFERASE, FIBROMETER: 344 U/L (ref 9–40)
CIRRHOMETER PATIENT SCORE: 0.11
EER FIBROMETER REPORT: ABNORMAL
FIBROMETER INTERPRETATION: ABNORMAL
FIBROMETER PATIENT SCORE: 0.99
FIBROMETER PLATELET COUNT: 289
FIBROMETER PROTHROMBIN INDEX: 80 % (ref 90–120)
FIBROSIS METAVIR CLASSIFICATION: ABNORMAL
GAMMA GLUTAMYL TRANSFERASE, FIBROMETER: 944 U/L (ref 7–33)
INFLAMETER METAVIR CLASSIFICATION: ABNORMAL
INFLAMETER PATIENT SCORE: 0.69
UREA NITROGEN, FIBROMETER: 11 MG/DL (ref 7–20)

## 2025-06-23 LAB
HBV E AG SERPL QL IA: NEGATIVE
HCV GENTYP SERPL NAA+PROBE: NORMAL

## 2025-08-14 ENCOUNTER — OFFICE VISIT (OUTPATIENT)
Dept: GASTROENTEROLOGY | Age: 35
End: 2025-08-14
Payer: MEDICAID

## 2025-08-14 VITALS
SYSTOLIC BLOOD PRESSURE: 126 MMHG | OXYGEN SATURATION: 99 % | WEIGHT: 120 LBS | BODY MASS INDEX: 21.26 KG/M2 | RESPIRATION RATE: 18 BRPM | HEIGHT: 63 IN | TEMPERATURE: 97.5 F | DIASTOLIC BLOOD PRESSURE: 89 MMHG | HEART RATE: 88 BPM

## 2025-08-14 DIAGNOSIS — F10.90 ALCOHOL USE: ICD-10-CM

## 2025-08-14 DIAGNOSIS — B18.2 CHRONIC HEPATITIS C WITHOUT HEPATIC COMA (HCC): Primary | ICD-10-CM

## 2025-08-14 PROCEDURE — 99214 OFFICE O/P EST MOD 30 MIN: CPT | Performed by: INTERNAL MEDICINE

## 2025-08-14 PROCEDURE — G2211 COMPLEX E/M VISIT ADD ON: HCPCS | Performed by: INTERNAL MEDICINE

## 2025-08-14 ASSESSMENT — ENCOUNTER SYMPTOMS
SORE THROAT: 0
VOICE CHANGE: 0
WHEEZING: 0
ABDOMINAL DISTENTION: 1
NAUSEA: 1
ANAL BLEEDING: 0
CONSTIPATION: 0
COUGH: 0
BLOOD IN STOOL: 0
RECTAL PAIN: 0
DIARRHEA: 0
CHOKING: 0
TROUBLE SWALLOWING: 0
VOMITING: 0
ABDOMINAL PAIN: 1

## 2025-08-20 ENCOUNTER — TELEPHONE (OUTPATIENT)
Dept: GASTROENTEROLOGY | Age: 35
End: 2025-08-20